# Patient Record
Sex: FEMALE | Race: BLACK OR AFRICAN AMERICAN | Employment: UNEMPLOYED | URBAN - METROPOLITAN AREA
[De-identification: names, ages, dates, MRNs, and addresses within clinical notes are randomized per-mention and may not be internally consistent; named-entity substitution may affect disease eponyms.]

---

## 2017-06-15 ENCOUNTER — OFFICE VISIT (OUTPATIENT)
Dept: FAMILY MEDICINE CLINIC | Age: 47
End: 2017-06-15

## 2017-06-15 ENCOUNTER — TELEPHONE (OUTPATIENT)
Dept: FAMILY MEDICINE CLINIC | Age: 47
End: 2017-06-15

## 2017-06-15 VITALS
TEMPERATURE: 98 F | WEIGHT: 201 LBS | HEIGHT: 67 IN | HEART RATE: 60 BPM | RESPIRATION RATE: 18 BRPM | BODY MASS INDEX: 31.55 KG/M2 | OXYGEN SATURATION: 98 % | SYSTOLIC BLOOD PRESSURE: 156 MMHG | DIASTOLIC BLOOD PRESSURE: 90 MMHG

## 2017-06-15 DIAGNOSIS — F32.A DEPRESSION, UNSPECIFIED DEPRESSION TYPE: ICD-10-CM

## 2017-06-15 DIAGNOSIS — Z12.4 CERVICAL CANCER SCREENING: ICD-10-CM

## 2017-06-15 DIAGNOSIS — E55.9 VITAMIN D DEFICIENCY: ICD-10-CM

## 2017-06-15 DIAGNOSIS — I10 HTN, GOAL BELOW 140/90: ICD-10-CM

## 2017-06-15 DIAGNOSIS — R20.9 COLD EXTREMITIES: ICD-10-CM

## 2017-06-15 DIAGNOSIS — Z13.1 DIABETES MELLITUS SCREENING: ICD-10-CM

## 2017-06-15 DIAGNOSIS — Z11.4 SCREENING FOR HIV (HUMAN IMMUNODEFICIENCY VIRUS): ICD-10-CM

## 2017-06-15 DIAGNOSIS — Z13.220 SCREENING FOR LIPID DISORDERS: ICD-10-CM

## 2017-06-15 DIAGNOSIS — N89.8 VAGINAL DISCHARGE: Primary | ICD-10-CM

## 2017-06-15 RX ORDER — CITALOPRAM 40 MG/1
40 TABLET, FILM COATED ORAL DAILY
Qty: 30 TAB | Refills: 1 | Status: SHIPPED | OUTPATIENT
Start: 2017-06-15 | End: 2017-09-14 | Stop reason: SDUPTHER

## 2017-06-15 RX ORDER — LISINOPRIL 10 MG/1
10 TABLET ORAL DAILY
COMMUNITY
End: 2017-06-15 | Stop reason: SDUPTHER

## 2017-06-15 RX ORDER — OMEPRAZOLE 40 MG/1
40 CAPSULE, DELAYED RELEASE ORAL DAILY
COMMUNITY
End: 2018-02-23

## 2017-06-15 RX ORDER — LISINOPRIL 10 MG/1
10 TABLET ORAL DAILY
Qty: 30 TAB | Refills: 1 | Status: SHIPPED | OUTPATIENT
Start: 2017-06-15 | End: 2017-09-14 | Stop reason: SDUPTHER

## 2017-06-15 RX ORDER — HYDROCHLOROTHIAZIDE 25 MG/1
25 TABLET ORAL DAILY
COMMUNITY
End: 2017-06-15 | Stop reason: SDUPTHER

## 2017-06-15 RX ORDER — CHOLECALCIFEROL (VITAMIN D3) 125 MCG
2000 CAPSULE ORAL DAILY
COMMUNITY

## 2017-06-15 RX ORDER — HYDROCHLOROTHIAZIDE 25 MG/1
25 TABLET ORAL DAILY
Qty: 30 TAB | Refills: 1 | Status: SHIPPED | OUTPATIENT
Start: 2017-06-15 | End: 2017-09-14 | Stop reason: SDUPTHER

## 2017-06-15 RX ORDER — CITALOPRAM 40 MG/1
40 TABLET, FILM COATED ORAL DAILY
COMMUNITY
End: 2017-06-15 | Stop reason: SDUPTHER

## 2017-06-15 NOTE — PATIENT INSTRUCTIONS

## 2017-06-15 NOTE — LETTER
6/26/2017 3:52 PM 
 
Ms. Melva Kate [address] [address] Dear Melva Kate: 
 
Please find your most recent results below. Resulted Orders NUSWAB VAGINITIS PLUS Result Value Ref Range Atopobium vaginae High - 2 (A) Score BVAB 2 High - 2 (A) Score Megasphaera 1 High - 2 (A) Score Comment:  
   Calculate total score by adding the 3 individual bacterial 
vaginosis (BV) marker scores together. Total score is 
interpreted as follows: Total score 0-1: Indicates the absence of BV. Total score   2: Indeterminate for BV. Additional clinical 
                 data should be evaluated to establish a 
                 diagnosis. Total score 3-6: Indicates the presence of BV. This test was developed and its performance characteristics 
determined by [facility].  It has not been cleared or approved 
by the Food and Drug Administration. The FDA has determined 
that such clearance or approval is not necessary. C. albicans, MYRIAM Negative Negative C. glabrata, MYRIAM Negative Negative Comment: This test was developed and its performance characteristics determined 
by [facility].  It has not been cleared or approved by the Food and Drug Administration. The FDA has determined that such clearance or 
approval is not necessary. T. vaginalis, MYRIAM Negative Negative C. trachomatis, MYRIAM Negative Negative N. gonorrhoeae, MYRIAM Negative Negative Narrative Performed at:  [address]  [id number] : Linder Lanes MD, Phone:  [phone] PAP IG, APTIMA HPV AND RFX 16/18,45 ([id number]) Result Value Ref Range Diagnosis Comment Comment:  
   NEGATIVE FOR INTRAEPITHELIAL LESION AND MALIGNANCY. Specimen adequacy Comment Comment:  
   Satisfactory for evaluation. Endocervical and/or squamous metaplastic 
cells (endocervical component) are present. Clinician provided ICD10 Comment Comment:  
   N89.8 Performed by: Comment Comment:  
   Sherryle Pace, Cytotechnologist (ASCP) Jaxson Leal Note: Comment Comment: The Pap smear is a screening test designed to aid in the detection of 
premalignant and malignant conditions of the uterine cervix. It is not a 
diagnostic procedure and should not be used as the sole means of detecting 
cervical cancer. Both false-positive and false-negative reports do occur. Test methodology Comment Comment: This liquid based ThinPrep(R) pap test was screened with the 
use of an image guided system. HPV APTIMA Negative Negative Comment: This test detects fourteen high-risk HPV types (16/18/31/33/35/39/45/ 
51/52/56/58/59/66/68) without differentiation. Narrative Specimen Comment: [id number] Specimen Comment: No. of containers. Jaxson Leal 01 CYTYC Thin Prep Vial 
Performed at:  [address]  [id number] : Halie Morris MD, Phone:  [phone] Performed at:  [address] [address]  [id number] : Halie Morris MD, Phone:  [phone] HEMOGLOBIN A1C WITH EAG Result Value Ref Range Hemoglobin A1c 6.0 (H) 4.8 - 5.6 % Comment:  
            Pre-diabetes: 5.7 - 6.4 Diabetes: >6.4 Glycemic control for adults with diabetes: <7.0 Estimated average glucose 126 mg/dL Narrative Performed at:  [address]  [id number] : Halie Morris MD, Phone:  [phone] METABOLIC PANEL, COMPREHENSIVE Result Value Ref Range Glucose 96 65 - 99 mg/dL BUN 11 6 - 24 mg/dL Creatinine 0.81 0.57 - 1.00 mg/dL GFR est non-AA 88 >59 mL/min/1.73 GFR est  >59 mL/min/1.73  
 BUN/Creatinine ratio 14 9 - 23 Sodium 144 134 - 144 mmol/L Potassium 4.5 3.5 - 5.2 mmol/L  Chloride 106 96 - 106 mmol/L  
 CO2 19 18 - 29 mmol/L Calcium 9.8 8.7 - 10.2 mg/dL Protein, total 6.9 6.0 - 8.5 g/dL Albumin 4.2 3.5 - 5.5 g/dL GLOBULIN, TOTAL 2.7 1.5 - 4.5 g/dL A-G Ratio 1.6 1.2 - 2.2 Bilirubin, total 0.3 0.0 - 1.2 mg/dL Alk. phosphatase 74 39 - 117 IU/L  
 AST (SGOT) 13 0 - 40 IU/L  
 ALT (SGPT) 11 0 - 32 IU/L Narrative Performed at:  [address]  [id number] : Alise Devlin MD, Phone:  [phone] TSH AND FREE T4 Result Value Ref Range TSH 1.790 0.450 - 4.500 uIU/mL T4, Free 1.03 0.82 - 1.77 ng/dL Narrative Performed at:  [address]  [id number] : Alise Devlin MD, Phone:  [phone] HIV 1/2 AG/AB, 4TH GENERATION,W RFLX CONFIRM Result Value Ref Range HIV SCREEN 4TH GENERATION WRFX Non Reactive Non Reactive Narrative Performed at:  [address]  [id number] : Alise Devlin MD, Phone:  [phone] LIPID PANEL Result Value Ref Range Cholesterol, total 182 100 - 199 mg/dL Triglyceride 98 0 - 149 mg/dL HDL Cholesterol 54 >39 mg/dL VLDL, calculated 20 5 - 40 mg/dL LDL, calculated 108 (H) 0 - 99 mg/dL Narrative Performed at:  [address]  [id number] : Alies Devlin MD, Phone:  [phone] VITAMIN D, 25 HYDROXY Result Value Ref Range VITAMIN D, 25-HYDROXY 17.6 (L) 30.0 - 100.0 ng/mL Comment:  
   Vitamin D deficiency has been defined by the [facility] practice guideline as a 
level of serum 25-OH vitamin D less than 20 ng/mL (1,2). The Endocrine Society went on to further define vitamin D 
insufficiency as a level between 21 and 29 ng/mL (2). 1. IOM ([place] [employer]). 2010. Dietary reference 
   intakes for calcium and D. [facility]:  [employer] 
 [employer]. 2. Declan MF, Ollie NC, Hunter YOUNG, et al. 
   Evaluation, treatment, and prevention of vitamin D 
   deficiency: an Endocrine Society clinical practice 
   guideline. JCEM. 2011 Jul; 96(7):1911-30. Narrative Performed at:  [address]  [id number] : Linder Lanes MD, Phone:  [phone] CVD REPORT Result Value Ref Range INTERPRETATION Note Comment:  
   Supplement report is available. Narrative Performed at:  [facility] [place] [facility] [address] [place] [address]  [id number] : Yohannes Graham PhD, Phone:  [phone] RECOMMENDATIONS: 
Pap test is normal with no HPV, which is great news. Have you ever had a history of abnormal Paps? Khoi Jules they have all been normal, your next Pap test would be due in 5 years. Reita Libman they have been abnormal please let us know so that we can recommend a proper follow-up time. Please call me if you have any questions: [phone] Sincerely, David Oliver MD

## 2017-06-15 NOTE — PROGRESS NOTES
Patient Name: Tera Pan   MRN: <[id number]>    Louann Welch is a 41 y.o. female who presents with the following: Here to establish care with new PCP. Vaginal discharge  Reports foul smelling vaginal discharge for several months. Admits that she is not good at remembering removing tampons. Has had STDs and PID in the past.  Does douche occasionally. No new soaps or lotions. Denies fevers, abdominal, n/v. The patient has hypertension. She reports taking medications as instructed, no medication side effects noted, patient does not perform home BP monitoring, no TIA's, no chest pain on exertion, no dyspnea on exertion, no swelling of ankles. Diet and Lifestyle: not attempting to follow a low fat, low cholesterol diet, sedentary. Lab review: no lab studies available for review at time of visit. Reports occasional numbness, tingling, and cold temperatures in extremities. Has not been consistently taking medications until the past 3 weeks. Review of Systems   Constitutional: Negative for chills, fever, malaise/fatigue and weight loss. HENT: Negative for hearing loss, nosebleeds and sore throat. Respiratory: Negative for cough, sputum production, shortness of breath and wheezing. Cardiovascular: Negative for chest pain, palpitations, leg swelling and PND. Gastrointestinal: Negative for abdominal pain, blood in stool, constipation, diarrhea, nausea and vomiting. Genitourinary: Negative for dysuria, frequency and urgency. Vaginal discharge   Musculoskeletal: Negative for back pain, falls, joint pain, myalgias and neck pain. Skin: Negative for itching and rash. Neurological: Positive for tingling. Negative for dizziness, sensory change, focal weakness and loss of consciousness. Psychiatric/Behavioral: Negative for depression. The patient is not nervous/anxious. All other systems reviewed and are negative.       The patient's medications, allergies, past medical history, surgical history, family history and social history were reviewed and updated where appropriate. Prior to Admission medications    Medication Sig Start Date End Date Taking? Authorizing Provider   hydroCHLOROthiazide (HYDRODIURIL) 25 mg tablet Take 25 mg by mouth daily. Yes Historical Provider   omeprazole (PRILOSEC) 40 mg capsule Take 40 mg by mouth daily. Yes Historical Provider   lisinopril (PRINIVIL, ZESTRIL) 10 mg tablet Take 10 mg by mouth daily. Yes Historical Provider   cholecalciferol, vitamin D3, (VITAMIN D3) 2,000 unit tab Take  by mouth. Yes Historical Provider   LINACLOTIDE (LINZESS PO) Take  by mouth. Historical Provider   citalopram (CELEXA) 40 mg tablet Take 40 mg by mouth daily. Historical Provider       Allergies   Allergen Reactions    Propofol Other (comments)     Patient states she has a difficult time waking up         Past Medical History:   Diagnosis Date    Depression     HTN, goal below 140/90     Hypercholesterolemia        Past Surgical History:   Procedure Laterality Date    HX  SECTION  1992, ,     HX LAPAROTOMY         Family History   Problem Relation Age of Onset    Drug Abuse Mother      cocaine    Depression Mother     Hypertension Mother     Drug Abuse Father      heroin    Cancer Maternal Grandmother      esophageal cancer    Cancer Maternal Grandfather     Colon Cancer Maternal Grandfather     Cancer Paternal Grandmother      brain    Prostate Cancer Paternal Grandfather     Lupus Maternal Aunt        Social History     Social History    Marital status:      Spouse name: N/A    Number of children: N/A    Years of education: N/A     Occupational History    Not on file.      Social History Main Topics    Smoking status: Current Some Day Smoker    Smokeless tobacco: Never Used      Comment: cigar    Alcohol use Yes      Comment: occasional    Drug use: No    Sexual activity: Yes     Other Topics Concern  Not on file     Social History Narrative    No narrative on file         OBJECTIVE    Visit Vitals    /90 (BP 1 Location: Right arm, BP Patient Position: Sitting)    Pulse 60    Temp 98 °F (36.7 °C) (Oral)    Resp 18    Ht 5' 7\" (1.702 m)    Wt 201 lb (91.2 kg)    LMP 05/22/2017 (Exact Date)    SpO2 98%    BMI 31.48 kg/m2       Physical Exam   Constitutional: She is well-developed, well-nourished, and in no distress. No distress. HENT:   Head: Normocephalic and atraumatic. Right Ear: External ear normal.   Left Ear: External ear normal.   Eyes: Conjunctivae and EOM are normal. Pupils are equal, round, and reactive to light. Cardiovascular: Normal rate, regular rhythm and normal heart sounds. Exam reveals no gallop and no friction rub. No murmur heard. Pulmonary/Chest: Effort normal and breath sounds normal. No respiratory distress. She has no wheezes. Skin: She is not diaphoretic. Psychiatric: Mood, memory, affect and judgment normal.   Nursing note and vitals reviewed. Pelvic exam: VULVA: normal appearing vulva with no masses, tenderness or lesions, VAGINA: normal appearing vagina with normal color and discharge, no lesions, CERVIX: normal appearing cervix without discharge or lesions, PAP: Pap smear done today, HPV test, no tampon in place. ASSESSMENT AND PLAN  Jessica Mahoney is a 41 y.o. female who presents today for:    1. Vaginal discharge  Recommend avoiding douching and tampon use. Will follow up with results and tx if needed. - NUSWAB VAGINITIS PLUS    2. Cervical cancer screening  - PAP IG, APTIMA HPV AND RFX 16/18,45 ([id number])    3. HTN, goal below 140/90  Above goal today. Keep meds the same as pt just started taking it regularly and RTC in 6 weeks. - hydroCHLOROthiazide (HYDRODIURIL) 25 mg tablet; Take 1 Tab by mouth daily. Dispense: 30 Tab; Refill: 1  - lisinopril (PRINIVIL, ZESTRIL) 10 mg tablet; Take 1 Tab by mouth daily. Dispense: 30 Tab; Refill: 1    4. Screening for HIV (human immunodeficiency virus)  - HIV 1/2 AG/AB, 4TH GENERATION,W RFLX CONFIRM    5. Vitamin D deficiency  - VITAMIN D, 25 HYDROXY    6. Screening for lipid disorders  Will calculate ASCVD risk score pending labs. - METABOLIC PANEL, COMPREHENSIVE  - LIPID PANEL    7. Cold extremities  - TSH AND FREE T4    8. Diabetes mellitus screening  - HEMOGLOBIN A1C WITH EAG    9. Depression, unspecified depression type  - citalopram (CELEXA) 40 mg tablet; Take 1 Tab by mouth daily. Dispense: 30 Tab; Refill: 1      Medications Discontinued During This Encounter   Medication Reason    citalopram (CELEXA) 40 mg tablet Reorder    hydroCHLOROthiazide (HYDRODIURIL) 25 mg tablet Reorder    lisinopril (PRINIVIL, ZESTRIL) 10 mg tablet Reorder    LINACLOTIDE (LINZESS PO) Not A Current Medication       Follow-up Disposition:  Return in about 6 weeks (around 7/27/2017) for HTN follow up. Medication risks/benefits/costs/interactions/alternatives discussed with patient. Advised patient to call back or return to office if symptoms worsen/change/persist. If patient cannot reach us or should anything more severe/urgent arise he/she should proceed directly to the nearest emergency department. Discussed expected course/resolution/complications of diagnosis in detail with patient. Patient given a written after visit summary which includes his/her diagnoses, current medications and vitals. Patient expressed understanding with the diagnosis and plan.      Jovon Acevedo M.D.

## 2017-06-15 NOTE — PROGRESS NOTES
Chief Complaint   Patient presents with    New Patient     establish care with physician    Hypertension     routine visit    Cholesterol Problem     routine visit    Vaginal Discharge     pelvic cramping and back, some odor     1. Have you been to the ER, urgent care clinic since your last visit? Hospitalized since your last visit? No    2. Have you seen or consulted any other health care providers outside of the [address] [place] [address] since your last visit? Include any pap smears or colon screening.  No

## 2017-06-15 NOTE — MR AVS SNAPSHOT
Visit Information Date & Time Provider Department Dept. Phone Encounter #  
 6/15/2017 10:00 AM Karal Medina MD [facility] [phone] [id number] Follow-up Instructions Return in about 6 weeks (around 7/27/2017) for HTN follow up. Upcoming Health Maintenance Date Due DTaP/Tdap/Td series (1 - Tdap) 5/26/1993 PAP AKA CERVICAL CYTOLOGY 5/26/1993 INFLUENZA AGE 9 TO ADULT 8/1/2017 Allergies as of 6/15/2017  Review Complete On: 6/15/2017 By: Sabrina Mixon LPN Severity Noted Reaction Type Reactions Propofol  06/15/2017    Other (comments) Patient states she has a difficult time waking up Current Immunizations  Never Reviewed No immunizations on file. Not reviewed this visit You Were Diagnosed With   
  
 Codes Comments Vaginal discharge    -  Primary ICD-10-CM: N89.8 ICD-9-CM: 623.5 Cervical cancer screening     ICD-10-CM: Z12.4 ICD-9-CM: V76.2 HTN, goal below 140/90     ICD-10-CM: I10 
ICD-9-CM: 401.9 Screening for HIV (human immunodeficiency virus)     ICD-10-CM: Z11.4 ICD-9-CM: V73.89 Vitamin D deficiency     ICD-10-CM: E55.9 ICD-9-CM: 268.9 Lipid screening     ICD-10-CM: J88.944 ICD-9-CM: V77.91 Vitals BP Pulse Temp Resp Height(growth percentile) Weight(growth percentile) 156/90 (BP 1 Location: Right arm, BP Patient Position: Sitting) 60 98 °F (36.7 °C) (Oral) 18 5' 7\" (1.702 m) 201 lb (91.2 kg) LMP SpO2 BMI OB Status Smoking Status 05/22/2017 (Exact Date) 98% 31.48 kg/m2 Having regular periods Current Some Day Smoker Vitals History BMI and BSA Data Body Mass Index Body Surface Area  
 31.48 kg/m 2 2.08 m 2 Your Updated Medication List  
  
   
This list is accurate as of: 6/15/17 11:09 AM.  Always use your most recent med list.  
  
  
  
  
 citalopram 40 mg tablet Commonly known as:  Emily Schmitz Take 40 mg by mouth daily. hydroCHLOROthiazide 25 mg tablet Commonly known as:  HYDRODIURIL Take 25 mg by mouth daily. LINZESS PO Take  by mouth.  
  
 lisinopril 10 mg tablet Commonly known as:  Alanis Camel Take 10 mg by mouth daily. PriLOSEC 40 mg capsule Generic drug:  omeprazole Take 40 mg by mouth daily. VITAMIN D3 2,000 unit Tab Generic drug:  cholecalciferol (vitamin D3) Take  by mouth. We Performed the Following HEMOGLOBIN A1C WITH EAG [00604 CPT(R)] HIV 1/2 AG/AB, 4TH GENERATION,W RFLX CONFIRM [[id number] Custom] LIPID PANEL [75338 CPT(R)] METABOLIC PANEL, COMPREHENSIVE [37275 CPT(R)] [address] [id number] Custom] PAP IG, APTIMA HPV AND RFX 16/18,45 ([id number]) [[id number] Custom] TSH AND FREE T4 [82517 CPT(R)] VITAMIN D, 25 HYDROXY [id number] CPT(R)] Follow-up Instructions Return in about 6 weeks (around 7/27/2017) for HTN follow up. Patient Instructions DASH Diet: Care Instructions Your Care Instructions The DASH diet is an eating plan that can help lower your blood pressure. DASH stands for Dietary Approaches to Stop Hypertension. Hypertension is high blood pressure. The DASH diet focuses on eating foods that are high in calcium, potassium, and magnesium. These nutrients can lower blood pressure. The foods that are highest in these nutrients are fruits, vegetables, low-fat dairy products, nuts, seeds, and legumes. But taking calcium, potassium, and magnesium supplements instead of eating foods that are high in those nutrients does not have the same effect. The DASH diet also includes whole grains, fish, and poultry. The DASH diet is one of several lifestyle changes your doctor may recommend to lower your high blood pressure. Your doctor may also want you to decrease the amount of sodium in your diet. Lowering sodium while following the DASH diet can lower blood pressure even further than just the DASH diet alone. Follow-up care is a key part of your treatment and safety. Be sure to make and go to all appointments, and call your doctor if you are having problems. It's also a good idea to know your test results and keep a list of the medicines you take. How can you care for yourself at home? Following the DASH diet · Eat 4 to 5 servings of fruit each day. A serving is 1 medium-sized piece of fruit, ½ cup chopped or canned fruit, 1/4 cup dried fruit, or 4 ounces (½ cup) of fruit juice. Choose fruit more often than fruit juice. · Eat 4 to 5 servings of vegetables each day. A serving is 1 cup of lettuce or raw leafy vegetables, ½ cup of chopped or cooked vegetables, or 4 ounces (½ cup) of vegetable juice. Choose vegetables more often than vegetable juice. · Get 2 to 3 servings of low-fat and fat-free dairy each day. A serving is 8 ounces of milk, 1 cup of yogurt, or 1 ½ ounces of cheese. · Eat 6 to 8 servings of grains each day. A serving is 1 slice of bread, 1 ounce of dry cereal, or ½ cup of cooked rice, pasta, or cooked cereal. Try to choose whole-grain products as much as possible. · Limit lean meat, poultry, and fish to 2 servings each day. A serving is 3 ounces, about the size of a deck of cards. · Eat 4 to 5 servings of nuts, seeds, and legumes (cooked dried beans, lentils, and split peas) each week. A serving is 1/3 cup of nuts, 2 tablespoons of seeds, or ½ cup of cooked beans or peas. · Limit fats and oils to 2 to 3 servings each day. A serving is 1 teaspoon of vegetable oil or 2 tablespoons of salad dressing. · Limit sweets and added sugars to 5 servings or less a week. A serving is 1 tablespoon jelly or jam, ½ cup sorbet, or 1 cup of lemonade. · Eat less than 2,300 milligrams (mg) of sodium a day. If you limit your sodium to 1,500 mg a day, you can lower your blood pressure even more. Tips for success · Start small.  Do not try to make dramatic changes to your diet all at once. You might feel that you are missing out on your favorite foods and then be more likely to not follow the plan. Make small changes, and stick with them. Once those changes become habit, add a few more changes. · Try some of the following: ¨ Make it a goal to eat a fruit or vegetable at every meal and at snacks. This will make it easy to get the recommended amount of fruits and vegetables each day. ¨ Try yogurt topped with fruit and nuts for a snack or healthy dessert. ¨ Add lettuce, tomato, cucumber, and onion to sandwiches. ¨ Combine a ready-made pizza crust with low-fat mozzarella cheese and lots of vegetable toppings. Try using tomatoes, squash, spinach, broccoli, carrots, cauliflower, and onions. ¨ Have a variety of cut-up vegetables with a low-fat dip as an appetizer instead of chips and dip. ¨ Sprinkle sunflower seeds or chopped almonds over salads. Or try adding chopped walnuts or almonds to cooked vegetables. ¨ Try some vegetarian meals using beans and peas. Add garbanzo or kidney beans to salads. Make burritos and tacos with mashed koch beans or black beans. Where can you learn more? Go to http://darcie-jessi.info/. Enter O796 in the search box to learn more about \"DASH Diet: Care Instructions. \" Current as of: March 23, 2016 Content Version: 11.2 © 2650-3247 [employer]. Care instructions adapted under license by [employer] (which disclaims liability or warranty for this information). If you have questions about a medical condition or this instruction, always ask your healthcare professional. [place] [address] any warranty or liability for your use of this information. Introducing [facility]! Leidy Springer introduces [employer] patient portal. Now you can access parts of your medical record, email your doctor's office, and request medication refills online.    
 
1. In your internet browser, go to [email or web address] 2. Click on the First Time User? Click Here link in the Sign In box. You will see the New Member Sign Up page. 3. Enter your [employer] Access Code exactly as it appears below. You will not need to use this code after youve completed the sign-up process. If you do not sign up before the expiration date, you must request a new code. · [employer] Access Code: [email or web address] Expires: 9/13/2017 10:44 AM 
 
4. Enter the last four digits of your Social Security Number (xxxx) and Date of Birth (mm/dd/yyyy) as indicated and click Submit. You will be taken to the next sign-up page. 5. Create a BISONt ID. This will be your [employer] login ID and cannot be changed, so think of one that is secure and easy to remember. 6. Create a [employer] password. You can change your password at any time. 7. Enter your Password Reset Question and Answer. This can be used at a later time if you forget your password. 8. Enter your e-mail address. You will receive e-mail notification when new information is available in [address] 9. Click Sign Up. You can now view and download portions of your medical record. 10. Click the Download Summary menu link to download a portable copy of your medical information. If you have questions, please visit the Frequently Asked Questions section of the [employer] website. Remember, [employer] is NOT to be used for urgent needs. For medical emergencies, dial 911. Now available from your iPhone and Android! Please provide this summary of care documentation to your next provider. Your primary care clinician is listed as Rico Pelletier. If you have any questions after today's visit, please call [phone].

## 2017-06-16 LAB
25(OH)D3+25(OH)D2 SERPL-MCNC: 17.6 NG/ML (ref 30–100)
ALBUMIN SERPL-MCNC: 4.2 G/DL (ref 3.5–5.5)
ALBUMIN/GLOB SERPL: 1.6 {RATIO} (ref 1.2–2.2)
ALP SERPL-CCNC: 74 IU/L (ref 39–117)
ALT SERPL-CCNC: 11 IU/L (ref 0–32)
AST SERPL-CCNC: 13 IU/L (ref 0–40)
BILIRUB SERPL-MCNC: 0.3 MG/DL (ref 0–1.2)
BUN SERPL-MCNC: 11 MG/DL (ref 6–24)
BUN/CREAT SERPL: 14 (ref 9–23)
CALCIUM SERPL-MCNC: 9.8 MG/DL (ref 8.7–10.2)
CHLORIDE SERPL-SCNC: 106 MMOL/L (ref 96–106)
CHOLEST SERPL-MCNC: 182 MG/DL (ref 100–199)
CO2 SERPL-SCNC: 19 MMOL/L (ref 18–29)
CREAT SERPL-MCNC: 0.81 MG/DL (ref 0.57–1)
EST. AVERAGE GLUCOSE BLD GHB EST-MCNC: 126 MG/DL
GLOBULIN SER CALC-MCNC: 2.7 G/DL (ref 1.5–4.5)
GLUCOSE SERPL-MCNC: 96 MG/DL (ref 65–99)
HBA1C MFR BLD: 6 % (ref 4.8–5.6)
HDLC SERPL-MCNC: 54 MG/DL
HIV 1+2 AB+HIV1 P24 AG SERPL QL IA: NON REACTIVE
INTERPRETATION, 910389: NORMAL
LDLC SERPL CALC-MCNC: 108 MG/DL (ref 0–99)
POTASSIUM SERPL-SCNC: 4.5 MMOL/L (ref 3.5–5.2)
PROT SERPL-MCNC: 6.9 G/DL (ref 6–8.5)
SODIUM SERPL-SCNC: 144 MMOL/L (ref 134–144)
T4 FREE SERPL-MCNC: 1.03 NG/DL (ref 0.82–1.77)
TRIGL SERPL-MCNC: 98 MG/DL (ref 0–149)
TSH SERPL DL<=0.005 MIU/L-ACNC: 1.79 UIU/ML (ref 0.45–4.5)
VLDLC SERPL CALC-MCNC: 20 MG/DL (ref 5–40)

## 2017-06-18 LAB
A VAGINAE DNA VAG QL NAA+PROBE: ABNORMAL SCORE
BVAB2 DNA VAG QL NAA+PROBE: ABNORMAL SCORE
C ALBICANS DNA VAG QL NAA+PROBE: NEGATIVE
C GLABRATA DNA VAG QL NAA+PROBE: NEGATIVE
C TRACH RRNA SPEC QL NAA+PROBE: NEGATIVE
MEGA1 DNA VAG QL NAA+PROBE: ABNORMAL SCORE
N GONORRHOEA RRNA SPEC QL NAA+PROBE: NEGATIVE
T VAGINALIS RRNA SPEC QL NAA+PROBE: NEGATIVE

## 2017-06-19 DIAGNOSIS — N76.0 BV (BACTERIAL VAGINOSIS): Primary | ICD-10-CM

## 2017-06-19 DIAGNOSIS — B96.89 BV (BACTERIAL VAGINOSIS): Primary | ICD-10-CM

## 2017-06-19 DIAGNOSIS — E55.9 VITAMIN D DEFICIENCY: ICD-10-CM

## 2017-06-19 RX ORDER — METRONIDAZOLE 500 MG/1
500 TABLET ORAL 2 TIMES DAILY
Qty: 14 TAB | Refills: 0 | Status: SHIPPED | OUTPATIENT
Start: 2017-06-19 | End: 2017-06-26

## 2017-06-19 RX ORDER — ASPIRIN 325 MG
TABLET, DELAYED RELEASE (ENTERIC COATED) ORAL
Qty: 8 CAP | Refills: 0 | Status: SHIPPED | OUTPATIENT
Start: 2017-06-19 | End: 2017-08-24

## 2017-06-19 NOTE — TELEPHONE ENCOUNTER
Jennifer Fox is not covered on this patient's insurance formulary, nor does she have the try and fail on other products to merit the PA.      Other alternatives per the pharmacy, Lactulose or Miralax

## 2017-06-19 NOTE — PROGRESS NOTES
Please notify patient regarding their test results:    Bacterial vaginosis diagnosis confirmed; 7 day course of oral Flagyl antibiotics have been sent to the pharmacy. Patient to avoid intercourse and alcohol consumption while on medications. Negative yeast infection, gonorrhea, chlamydia, and Trichomonas infection. Negative HIV. Hemoglobin A1C (average blood sugar level for past 3 months) is in the pre diabetes range. Elevated LCL cholesterol. I would encourage healthy diets and regular exercise with the goal of healthy weight loss before starting medications for this. Thyroid levels are normal.   Vitamin D levels are low. A prescription for high dose vitamin D3 at 50,000 units once a week for 8 weeks has been sent. After 8 weeks, switch to a lower dose of over-the-counter vitamin D3 2000 units every day. Pap smear results are still pending; will follow up with results finalized.

## 2017-06-19 NOTE — TELEPHONE ENCOUNTER
Instead of prescription medication at this point, would recommend daily Metamucil supplements until follow-up appointment to discuss constipation further during office visit as this was not addressed during our initial visit.

## 2017-06-20 NOTE — TELEPHONE ENCOUNTER
Spoke to patient this AM, advised her of the suggestions below. Patient/ Caller given an opportunity to ask questions, repeated information, and verbalized understanding.

## 2017-06-21 LAB
CYTOLOGIST CVX/VAG CYTO: NORMAL
CYTOLOGY CVX/VAG DOC THIN PREP: NORMAL
DX ICD CODE: NORMAL
HPV I/H RISK 4 DNA CVX QL PROBE+SIG AMP: NEGATIVE
Lab: NORMAL
OTHER STN SPEC: NORMAL
PATH REPORT.FINAL DX SPEC: NORMAL
STAT OF ADQ CVX/VAG CYTO-IMP: NORMAL

## 2017-06-22 NOTE — PROGRESS NOTES
Please notify patient regarding their test results:    Pap test is normal with no HPV, which is great news. Please clarify if patient has ever had a history of abnormal Paps (including date). If they have all been normal, her next Pap test would be due in 5 years. If they have been abnormal please relay back to me so that I can recommend a proper follow-up time.

## 2017-06-22 NOTE — PROGRESS NOTES
Called patient and advised of results, advised would follow up with Pap results when available, patient will  prescriptions. Patient/ Caller given an opportunity to ask questions, repeated information, and verbalized understanding.

## 2017-07-03 ENCOUNTER — TELEPHONE (OUTPATIENT)
Dept: FAMILY MEDICINE CLINIC | Age: 47
End: 2017-07-03

## 2017-07-03 NOTE — TELEPHONE ENCOUNTER
Insurance does not cover [address] patient needs to have a trail of Lactulose or Polyethylene glycol.

## 2017-07-12 ENCOUNTER — TELEPHONE (OUTPATIENT)
Dept: FAMILY MEDICINE CLINIC | Age: 47
End: 2017-07-12

## 2017-07-12 NOTE — TELEPHONE ENCOUNTER
Kishore Hinkle     -     [phone]     -   Patient is having a chocolate brown discharge -   Requesting to speak with the nurse

## 2017-07-12 NOTE — TELEPHONE ENCOUNTER
Writer returned call to patient, patient advised she has noted brown odorless discharge from her vaginal area since previous appointment 06/15 and today. Patient was prescribed course of antibiotics Flagyl for BV and completed the medication. Patient advises she has not had a menstrual cycle since her last office visit. Patient inquired if discharge was related to medication, writer advised patient that is not a general side effect of Flagyl. Inquiry sent to provider for review and advisements.

## 2017-07-13 NOTE — TELEPHONE ENCOUNTER
Recommend pt to take home pregnancy test due to missed period if she has been sexually active since last OV. Pt to make appt if symptoms of ongoing discharge persist; agree that this should not be side effect of medication.

## 2017-07-13 NOTE — TELEPHONE ENCOUNTER
Patient is calling in regards to a missed call from [place], tried contacting nurse, no success.     Best call back # for patient: [phone]

## 2017-07-13 NOTE — TELEPHONE ENCOUNTER
Writer returned call to patient, reviewed below advisements from . Patient understood and agreed to understanding.

## 2017-08-03 ENCOUNTER — OFFICE VISIT (OUTPATIENT)
Dept: FAMILY MEDICINE CLINIC | Age: 47
End: 2017-08-03

## 2017-08-03 VITALS
TEMPERATURE: 97.9 F | SYSTOLIC BLOOD PRESSURE: 138 MMHG | HEIGHT: 67 IN | OXYGEN SATURATION: 98 % | BODY MASS INDEX: 30.7 KG/M2 | DIASTOLIC BLOOD PRESSURE: 78 MMHG | WEIGHT: 195.6 LBS | HEART RATE: 56 BPM | RESPIRATION RATE: 18 BRPM

## 2017-08-03 DIAGNOSIS — I10 HTN, GOAL BELOW 140/90: Primary | ICD-10-CM

## 2017-08-03 DIAGNOSIS — N91.5 OLIGOMENORRHEA: ICD-10-CM

## 2017-08-03 DIAGNOSIS — Z23 ENCOUNTER FOR IMMUNIZATION: ICD-10-CM

## 2017-08-03 LAB
HCG URINE, QL. (POC): NEGATIVE
VALID INTERNAL CONTROL?: YES

## 2017-08-03 NOTE — MR AVS SNAPSHOT
Visit Information Date & Time Provider Department Dept. Phone Encounter #  
 8/3/2017  9:45 AM Reji Heath  [facility] [address] [phone] [id number] Follow-up Instructions Return in about 3 months (around 11/3/2017) for HTN follow up. Upcoming Health Maintenance Date Due Pneumococcal 19-64 Medium Risk (1 of 1 - PPSV23) 5/26/1991 DTaP/Tdap/Td series (1 - Tdap) 5/26/1993 INFLUENZA AGE 9 TO ADULT 8/1/2017 PAP AKA CERVICAL CYTOLOGY 6/15/2022 Allergies as of 8/3/2017  Review Complete On: 8/3/2017 By: Andre Diaz LPN Severity Noted Reaction Type Reactions Propofol  06/15/2017    Other (comments) Patient states she has a difficult time waking up Current Immunizations  Never Reviewed No immunizations on file. Not reviewed this visit You Were Diagnosed With   
  
 Codes Comments Amenorrhea    -  Primary ICD-10-CM: N91.2 ICD-9-CM: 626.0 Vitals BP Pulse Temp Resp Height(growth percentile) Weight(growth percentile) 138/78 (BP 1 Location: Left arm, BP Patient Position: Sitting) (!) 56 97.9 °F (36.6 °C) (Oral) 18 5' 7\" (1.702 m) 195 lb 9.6 oz (88.7 kg) LMP SpO2 BMI OB Status Smoking Status 06/14/2017 (Approximate) 98% 30.64 kg/m2 Having regular periods Current Some Day Smoker Vitals History BMI and BSA Data Body Mass Index Body Surface Area  
 30.64 kg/m 2 2.05 m 2 Preferred Pharmacy Pharmacy Name Phone [id number] [place], [address] [facility] [phone] Your Updated Medication List  
  
   
This list is accurate as of: 8/3/17 10:07 AM.  Always use your most recent med list.  
  
  
  
  
 citalopram 40 mg tablet Commonly known as:  Kaushik Campanile Take 1 Tab by mouth daily. hydroCHLOROthiazide 25 mg tablet Commonly known as:  HYDRODIURIL Take 1 Tab by mouth daily. linaclotide 145 mcg Cap capsule Commonly known as:  Marcello Triana Take 1 Cap by mouth Daily (before breakfast). lisinopril 10 mg tablet Commonly known as:  Gilles Carbajal Take 1 Tab by mouth daily. PriLOSEC 40 mg capsule Generic drug:  omeprazole Take 40 mg by mouth daily. * VITAMIN D3 2,000 unit Tab Generic drug:  cholecalciferol (vitamin D3) Take  by mouth. * Cholecalciferol (Vitamin D3) 50,000 unit Cap Take 1 capsule once a week for the next 8 weeks; if available OTC, please notify pt * Notice: This list has 2 medication(s) that are the same as other medications prescribed for you. Read the directions carefully, and ask your doctor or other care provider to review them with you. We Performed the Following AMB POC URINE PREGNANCY TEST, VISUAL COLOR COMPARISON [05321 CPT(R)] Follow-up Instructions Return in about 3 months (around 11/3/2017) for HTN follow up. Patient Instructions DASH Diet: Care Instructions Your Care Instructions The DASH diet is an eating plan that can help lower your blood pressure. DASH stands for Dietary Approaches to Stop Hypertension. Hypertension is high blood pressure. The DASH diet focuses on eating foods that are high in calcium, potassium, and magnesium. These nutrients can lower blood pressure. The foods that are highest in these nutrients are fruits, vegetables, low-fat dairy products, nuts, seeds, and legumes. But taking calcium, potassium, and magnesium supplements instead of eating foods that are high in those nutrients does not have the same effect. The DASH diet also includes whole grains, fish, and poultry. The DASH diet is one of several lifestyle changes your doctor may recommend to lower your high blood pressure. Your doctor may also want you to decrease the amount of sodium in your diet. Lowering sodium while following the DASH diet can lower blood pressure even further than just the DASH diet alone. Follow-up care is a key part of your treatment and safety. Be sure to make and go to all appointments, and call your doctor if you are having problems. It's also a good idea to know your test results and keep a list of the medicines you take. How can you care for yourself at home? Following the DASH diet · Eat 4 to 5 servings of fruit each day. A serving is 1 medium-sized piece of fruit, ½ cup chopped or canned fruit, 1/4 cup dried fruit, or 4 ounces (½ cup) of fruit juice. Choose fruit more often than fruit juice. · Eat 4 to 5 servings of vegetables each day. A serving is 1 cup of lettuce or raw leafy vegetables, ½ cup of chopped or cooked vegetables, or 4 ounces (½ cup) of vegetable juice. Choose vegetables more often than vegetable juice. · Get 2 to 3 servings of low-fat and fat-free dairy each day. A serving is 8 ounces of milk, 1 cup of yogurt, or 1 ½ ounces of cheese. · Eat 6 to 8 servings of grains each day. A serving is 1 slice of bread, 1 ounce of dry cereal, or ½ cup of cooked rice, pasta, or cooked cereal. Try to choose whole-grain products as much as possible. · Limit lean meat, poultry, and fish to 2 servings each day. A serving is 3 ounces, about the size of a deck of cards. · Eat 4 to 5 servings of nuts, seeds, and legumes (cooked dried beans, lentils, and split peas) each week. A serving is 1/3 cup of nuts, 2 tablespoons of seeds, or ½ cup of cooked beans or peas. · Limit fats and oils to 2 to 3 servings each day. A serving is 1 teaspoon of vegetable oil or 2 tablespoons of salad dressing. · Limit sweets and added sugars to 5 servings or less a week. A serving is 1 tablespoon jelly or jam, ½ cup sorbet, or 1 cup of lemonade. · Eat less than 2,300 milligrams (mg) of sodium a day. If you limit your sodium to 1,500 mg a day, you can lower your blood pressure even more. Tips for success · Start small.  Do not try to make dramatic changes to your diet all at once. You might feel that you are missing out on your favorite foods and then be more likely to not follow the plan. Make small changes, and stick with them. Once those changes become habit, add a few more changes. · Try some of the following: ¨ Make it a goal to eat a fruit or vegetable at every meal and at snacks. This will make it easy to get the recommended amount of fruits and vegetables each day. ¨ Try yogurt topped with fruit and nuts for a snack or healthy dessert. ¨ Add lettuce, tomato, cucumber, and onion to sandwiches. ¨ Combine a ready-made pizza crust with low-fat mozzarella cheese and lots of vegetable toppings. Try using tomatoes, squash, spinach, broccoli, carrots, cauliflower, and onions. ¨ Have a variety of cut-up vegetables with a low-fat dip as an appetizer instead of chips and dip. ¨ Sprinkle sunflower seeds or chopped almonds over salads. Or try adding chopped walnuts or almonds to cooked vegetables. ¨ Try some vegetarian meals using beans and peas. Add garbanzo or kidney beans to salads. Make burritos and tacos with mashed koch beans or black beans. Where can you learn more? Go to http://darcie-jessi.info/. Enter S574 in the search box to learn more about \"DASH Diet: Care Instructions. \" Current as of: April 3, 2017 Content Version: 11.3 © 9669-3487 [employer]. Care instructions adapted under license by [employer] (which disclaims liability or warranty for this information). If you have questions about a medical condition or this instruction, always ask your healthcare professional. [place] [address] any warranty or liability for your use of this information. Introducing [facility]! Franc Balderas introduces [facility] patient portal. Now you can access parts of your medical record, email your doctor's office, and request medication refills online.    
 
1. In your internet browser, go to [email or web address] 2. Click on the First Time User? Click Here link in the Sign In box. You will see the New Member Sign Up page. 3. Enter your [employer] Access Code exactly as it appears below. You will not need to use this code after youve completed the sign-up process. If you do not sign up before the expiration date, you must request a new code. · [employer] Access Code: [email or web address] Expires: 9/13/2017 10:44 AM 
 
4. Enter the last four digits of your Social Security Number (xxxx) and Date of Birth (mm/dd/yyyy) as indicated and click Submit. You will be taken to the next sign-up page. 5. Create a [employer] ID. This will be your [employer] login ID and cannot be changed, so think of one that is secure and easy to remember. 6. Create a [employer] password. You can change your password at any time. 7. Enter your Password Reset Question and Answer. This can be used at a later time if you forget your password. 8. Enter your e-mail address. You will receive e-mail notification when new information is available in [address] 9. Click Sign Up. You can now view and download portions of your medical record. 10. Click the Download Summary menu link to download a portable copy of your medical information. If you have questions, please visit the Frequently Asked Questions section of the [employer] website. Remember, [employer] is NOT to be used for urgent needs. For medical emergencies, dial 911. Now available from your iPhone and Android! Please provide this summary of care documentation to your next provider. Your primary care clinician is listed as Rico Pelletier. If you have any questions after today's visit, please call [phone].

## 2017-08-03 NOTE — PROGRESS NOTES
Patient Name: Tino Crockett   MRN: <[id number]>    Melida Amin is a 41 y.o. female who presents with the following: The patient has hypertension. She reports taking medications as instructed, no medication side effects noted, patient does not perform home BP monitoring, no TIA's, no chest pain on exertion, no dyspnea on exertion, no swelling of ankles. Diet and Lifestyle: generally follows a low fat low cholesterol diet, generally follows a low sodium diet, exercises regularly. Lab review: no lab studies available for review at time of visit. Has been taking medications more consistently. Under a lot of stress due to loss of employment and looking for a new job. Her last menses was 6/14/2017. Hx of BTL and has had multiple negative home UPTs (negative here in office). Believes her mother had menopause in her 46s. Previously had some brown vaginal discharge s/p treatment for BV but denies any return of prior symptoms recently. Review of Systems   Constitutional: Negative for fever, malaise/fatigue and weight loss. Respiratory: Negative for cough, hemoptysis, shortness of breath and wheezing. Cardiovascular: Negative for chest pain, palpitations, leg swelling and PND. Gastrointestinal: Negative for abdominal pain, constipation, diarrhea, nausea and vomiting. The patient's medications, allergies, past medical history, surgical history, family history and social history were reviewed and updated where appropriate. Prior to Admission medications    Medication Sig Start Date End Date Taking? Authorizing Provider   Cholecalciferol, Vitamin D3, 50,000 unit cap Take 1 capsule once a week for the next 8 weeks; if available OTC, please notify pt 6/19/17  Yes Rafael Medrano MD   omeprazole (PRILOSEC) 40 mg capsule Take 40 mg by mouth daily. Yes Historical Provider   cholecalciferol, vitamin D3, (VITAMIN D3) 2,000 unit tab Take  by mouth.    Yes Historical Provider citalopram (CELEXA) 40 mg tablet Take 1 Tab by mouth daily. 6/15/17  Yes Serg Teran MD   hydroCHLOROthiazide (HYDRODIURIL) 25 mg tablet Take 1 Tab by mouth daily. 6/15/17  Yes Rico Pelletier MD   lisinopril (PRINIVIL, ZESTRIL) 10 mg tablet Take 1 Tab by mouth daily. 6/15/17  Yes Serg Teran MD   linaclotide (LINZESS) 145 mcg cap capsule Take 1 Cap by mouth Daily (before breakfast). 6/15/17  Yes Serg Teran MD       Allergies   Allergen Reactions    Propofol Other (comments)     Patient states she has a difficult time waking up           OBJECTIVE    Visit Vitals    BP (!) 152/97 (BP 1 Location: Left arm, BP Patient Position: Sitting)    Pulse (!) 56    Temp 97.9 °F (36.6 °C) (Oral)    Resp 18    Ht 5' 7\" (1.702 m)    Wt 195 lb 9.6 oz (88.7 kg)    LMP 06/14/2017 (Approximate)  Comment: patient has taken 2 home pregnancy tests both negative     SpO2 98%    BMI 30.64 kg/m2       Physical Exam   Constitutional: She is oriented to person, place, and time and well-developed, well-nourished, and in no distress. No distress. HENT:   Head: Normocephalic and atraumatic. Right Ear: External ear normal.   Left Ear: External ear normal.   Eyes: Conjunctivae and EOM are normal. Pupils are equal, round, and reactive to light. Neurological: She is alert and oriented to person, place, and time. Gait normal.   Skin: She is not diaphoretic. Psychiatric: Mood, memory, affect and judgment normal.   Nursing note and vitals reviewed. ASSESSMENT AND PLAN  Jessica Euceda is a 41 y.o. female who presents today for:    1. HTN, goal below 140/90  Stable, continue current treatment. 2. Oligomenorrhea  UPT negative today. Reviewed that she may be pre-menopausal or external stress may be affect HPA axis, causing irregular periods. If she still has not had menses in 3 month's time, consider checking LH/FSH and other labs. - AMB POC URINE PREGNANCY TEST, VISUAL COLOR COMPARISON    3.  Encounter for immunization  - [place] IMMUNIZ ADMIN,1 SINGLE/COMB VAC/TOXOID  - Tetanus, diphtheria toxoids and acellular pertussis (TDAP) vaccine, in individuals >=7 years, IM       There are no discontinued medications. Follow-up Disposition:  Return in about 3 months (around 11/3/2017) for HTN follow up. Medication risks/benefits/costs/interactions/alternatives discussed with patient. Advised patient to call back or return to office if symptoms worsen/change/persist. If patient cannot reach us or should anything more severe/urgent arise he/she should proceed directly to the nearest emergency department. Discussed expected course/resolution/complications of diagnosis in detail with patient. Patient given a written after visit summary which includes his/her diagnoses, current medications and vitals. Patient expressed understanding with the diagnosis and plan.      Joaquina Ellison M.D.

## 2017-08-03 NOTE — PROGRESS NOTES
\"Reviewed record in preparation for visit and have obtained the necessary documentation\"  Chief Complaint   Patient presents with    Hypertension     follow up     Vaginal Discharge     brown          Patient presents in the office today for a follow up of hypertension    Patient also requested to discuss brown discharge noted from vaginal area in July, approximately July 12,patient advised to take pregnancy test,patient took urine pregnancy test x 2 July 12 and 31, both negative     Patient did not have menstrual cycle for the month of July and advised she no longer has noted the discharge      1. Have you been to the ER, urgent care clinic since your last visit? Hospitalized since your last visit? No    2. Have you seen or consulted any other health care providers outside of the [address] [place] [address] since your last visit? Include any pap smears or colon screening.  No

## 2017-08-03 NOTE — PATIENT INSTRUCTIONS
DASH Diet: Care Instructions  Your Care Instructions  The DASH diet is an eating plan that can help lower your blood pressure. DASH stands for Dietary Approaches to Stop Hypertension. Hypertension is high blood pressure. The DASH diet focuses on eating foods that are high in calcium, potassium, and magnesium. These nutrients can lower blood pressure. The foods that are highest in these nutrients are fruits, vegetables, low-fat dairy products, nuts, seeds, and legumes. But taking calcium, potassium, and magnesium supplements instead of eating foods that are high in those nutrients does not have the same effect. The DASH diet also includes whole grains, fish, and poultry. The DASH diet is one of several lifestyle changes your doctor may recommend to lower your high blood pressure. Your doctor may also want you to decrease the amount of sodium in your diet. Lowering sodium while following the DASH diet can lower blood pressure even further than just the DASH diet alone. Follow-up care is a key part of your treatment and safety. Be sure to make and go to all appointments, and call your doctor if you are having problems. It's also a good idea to know your test results and keep a list of the medicines you take. How can you care for yourself at home? Following the DASH diet  · Eat 4 to 5 servings of fruit each day. A serving is 1 medium-sized piece of fruit, ½ cup chopped or canned fruit, 1/4 cup dried fruit, or 4 ounces (½ cup) of fruit juice. Choose fruit more often than fruit juice. · Eat 4 to 5 servings of vegetables each day. A serving is 1 cup of lettuce or raw leafy vegetables, ½ cup of chopped or cooked vegetables, or 4 ounces (½ cup) of vegetable juice. Choose vegetables more often than vegetable juice. · Get 2 to 3 servings of low-fat and fat-free dairy each day. A serving is 8 ounces of milk, 1 cup of yogurt, or 1 ½ ounces of cheese. · Eat 6 to 8 servings of grains each day.  A serving is 1 slice of bread, 1 ounce of dry cereal, or ½ cup of cooked rice, pasta, or cooked cereal. Try to choose whole-grain products as much as possible. · Limit lean meat, poultry, and fish to 2 servings each day. A serving is 3 ounces, about the size of a deck of cards. · Eat 4 to 5 servings of nuts, seeds, and legumes (cooked dried beans, lentils, and split peas) each week. A serving is 1/3 cup of nuts, 2 tablespoons of seeds, or ½ cup of cooked beans or peas. · Limit fats and oils to 2 to 3 servings each day. A serving is 1 teaspoon of vegetable oil or 2 tablespoons of salad dressing. · Limit sweets and added sugars to 5 servings or less a week. A serving is 1 tablespoon jelly or jam, ½ cup sorbet, or 1 cup of lemonade. · Eat less than 2,300 milligrams (mg) of sodium a day. If you limit your sodium to 1,500 mg a day, you can lower your blood pressure even more. Tips for success  · Start small. Do not try to make dramatic changes to your diet all at once. You might feel that you are missing out on your favorite foods and then be more likely to not follow the plan. Make small changes, and stick with them. Once those changes become habit, add a few more changes. · Try some of the following:  ¨ Make it a goal to eat a fruit or vegetable at every meal and at snacks. This will make it easy to get the recommended amount of fruits and vegetables each day. ¨ Try yogurt topped with fruit and nuts for a snack or healthy dessert. ¨ Add lettuce, tomato, cucumber, and onion to sandwiches. ¨ Combine a ready-made pizza crust with low-fat mozzarella cheese and lots of vegetable toppings. Try using tomatoes, squash, spinach, broccoli, carrots, cauliflower, and onions. ¨ Have a variety of cut-up vegetables with a low-fat dip as an appetizer instead of chips and dip. ¨ Sprinkle sunflower seeds or chopped almonds over salads. Or try adding chopped walnuts or almonds to cooked vegetables. ¨ Try some vegetarian meals using beans and peas. Add garbanzo or kidney beans to salads. Make burritos and tacos with mashed koch beans or black beans. Where can you learn more? Go to http://darcie-jessi.info/. Enter Z186 in the search box to learn more about \"DASH Diet: Care Instructions. \"  Current as of: April 3, 2017  Content Version: 11.3  © 4123-4160 [employer]. Care instructions adapted under license by [employer] (which disclaims liability or warranty for this information). If you have questions about a medical condition or this instruction, always ask your healthcare professional. [address] any warranty or liability for your use of this information. Vaccine Information Statement     Tdap (Tetanus, Diphtheria, Pertussis) Vaccine: What You Need to Know    Many Vaccine Information Statements are available in Indonesian and other languages. See www.immunize.org/vis. Hojas de Información Sobre Vacunas están disponibles en español y en muchos otros idiomas. [facility]    1. Why get vaccinated? Tetanus, diphtheria, and pertussis are very serious diseases. Tdap vaccine can protect us from these diseases. And, Tdap vaccine given to pregnant women can protect  babies against pertussis. TETANUS (Lockjaw) is rare in the [facility] today. It causes painful muscle tightening and stiffness, usually all over the body.  It can lead to tightening of muscles in the head and neck so you cant open your mouth, swallow, or sometimes even breathe. Tetanus kills about 1 out of 10 people who are infected even after receiving the best medical care. DIPHTHERIA is also rare in the [facility] today. It can cause a thick coating to form in the back of the throat.  It can lead to breathing problems, heart failure, paralysis, and death.     PERTUSSIS (Whooping Cough) causes severe coughing spells, which can cause difficulty breathing, vomiting, and disturbed sleep.   It can also lead to weight loss, incontinence, and rib fractures. Up to 2 in 100 adolescents and 5 in 100 adults with pertussis are hospitalized or have complications, which could include pneumonia or death. These diseases are caused by bacteria. Diphtheria and pertussis are spread from person to person through secretions from coughing or sneezing. Tetanus enters the body through cuts, scratches, or wounds. Before vaccines, as many as 200,000 cases of diphtheria, 200,000 cases of pertussis, and hundreds of cases of tetanus, were reported in the [place] each year. Since vaccination began, reports of cases for tetanus and diphtheria have dropped by about 99% and for pertussis by about 80%. 2. Tdap vaccine    Tdap vaccine can protect adolescents and adults from tetanus, diphtheria, and pertussis. One dose of Tdap is routinely given at age 6 or 15. People who did not get Tdap at that age should get it as soon as possible. Tdap is especially important for health care professionals and anyone having close contact with a baby younger than 12 months. Pregnant women should get a dose of Tdap during every pregnancy, to protect the  from pertussis. Infants are most at risk for severe, life-threatening complications from pertussis. Another vaccine, called Td, protects against tetanus and diphtheria, but not pertussis. A Td booster should be given every 10 years. Tdap may be given as one of these boosters if you have never gotten Tdap before. Tdap may also be given after a severe cut or burn to prevent tetanus infection. Your doctor or the person giving you the vaccine can give you more information. Tdap may safely be given at the same time as other vaccines.     3. Some people should not get this vaccine     A person who has ever had a life-threatening allergic reaction after a previous dose of any diphtheria, tetanus or pertussis containing vaccine, OR has a severe allergy to any part of this vaccine, should not get Tdap vaccine. Tell the person giving the vaccine about any severe allergies.  Anyone who had coma or long repeated seizures within 7 days after a childhood dose of DTP or DTaP, or a previous dose of Tdap, should not get Tdap, unless a cause other than the vaccine was found. They can still get Td.  Talk to your doctor if you:  - have seizures or another nervous system problem,  - had severe pain or swelling after any vaccine containing diphtheria, tetanus or pertussis,   - ever had a condition called Guillain Barré Syndrome (GBS),  - arent feeling well on the day the shot is scheduled. 4. Risks    With any medicine, including vaccines, there is a chance of side effects. These are usually mild and go away on their own. Serious reactions are also possible but are rare. Most people who get Tdap vaccine do not have any problems with it.     Mild Problems following Tdap  (Did not interfere with activities)   Pain where the shot was given (about 3 in 4 adolescents or 2 in 3 adults)   Redness or swelling where the shot was given (about 1 person in 5)   Mild fever of at least 100.4°F (up to about 1 in 25 adolescents or 1 in 100 adults)   Headache (about 3 or 4 people in 10)   Tiredness (about 1 person in 3 or 4)   Nausea, vomiting, diarrhea, stomach ache (up to 1 in 4 adolescents or 1 in 10 adults)   Chills,  sore joints (about 1 person in 10)   Body aches (about 1 person in 3 or 4)    Rash, swollen glands (uncommon)    Moderate Problems following Tdap  (Interfered with activities, but did not require medical attention)   Pain where the shot was given (up to 1 in 5 or 6)    Redness or swelling where the shot was given (up to about 1 in 16 adolescents or 1 in 12 adults)   Fever over 102°F (about 1 in 100 adolescents or 1 in 250 adults)   Headache (about 1 in 7 adolescents or 1 in 10 adults)   Nausea, vomiting, diarrhea, stomach ache (up to 1 or 3 people in 100)   Swelling of the entire arm where the shot was given (up to about 1 in 500). Severe Problems following Tdap  (Unable to perform usual activities; required medical attention)   Swelling, severe pain, bleeding, and redness in the arm where the shot was given (rare). Problems that could happen after any vaccine:     People sometimes faint after a medical procedure, including vaccination. Sitting or lying down for about 15 minutes can help prevent fainting, and injuries caused by a fall. Tell your doctor if you feel dizzy, or have vision changes or ringing in the ears.  Some people get severe pain in the shoulder and have difficulty moving the arm where a shot was given. This happens very rarely.  Any medication can cause a severe allergic reaction. Such reactions from a vaccine are very rare, estimated at fewer than 1 in a million doses, and would happen within a few minutes to a few hours after the vaccination. As with any medicine, there is a very remote chance of a vaccine causing a serious injury or death. The safety of vaccines is always being monitored. For more information, visit: www.cdc.gov/vaccinesafety/    5. What if there is a serious problem? What should I look for?  Look for anything that concerns you, such as signs of a severe allergic reaction, very high fever, or unusual behavior.  Signs of a severe allergic reaction can include hives, swelling of the face and throat, difficulty breathing, a fast heartbeat, dizziness, and weakness. These would usually start a few minutes to a few hours after the vaccination. What should I do?  If you think it is a severe allergic reaction or other emergency that cant wait, call 9-1-1 or get the person to the nearest hospital. Otherwise, call your doctor.  Afterward, the reaction should be reported to the Vaccine Adverse Event Reporting System (VAERS).  Your doctor might file this report, or you can do it yourself through the VAERS web site at www.vaers. [facility].gov, or by calling 5-772.381.6320. VAERS does not give medical advice. 6. The National Vaccine Injury Compensation Program    The [facility] Vaccine Injury Compensation Program (VICP) is a federal program that was created to compensate people who may have been injured by certain vaccines. Persons who believe they may have been injured by a vaccine can learn about the program and about filing a claim by calling 1-420.966.3362 or visiting the [employer] website at www.[facility].gov/vaccinecompensation. There is a time limit to file a claim for compensation. 7. How can I learn more?  Ask your doctor. He or she can give you the vaccine package insert or suggest other sources of information.  Call your local or state health department.  Contact the Centers for Disease Control and Prevention (CDC):  - Call 4-698.608.2530 (1-800-CDC-INFO) or  - Visit CDCs website at www.cdc.gov/vaccines      Vaccine Information Statement   Tdap Vaccine  (2/24/2015)  [address] [place] 322DV-10    Department of Health and Human Services  Centers for Disease Control and Prevention    Office Use Only

## 2017-08-15 ENCOUNTER — TELEPHONE (OUTPATIENT)
Dept: FAMILY MEDICINE CLINIC | Age: 47
End: 2017-08-15

## 2017-08-15 NOTE — TELEPHONE ENCOUNTER
Call to pateint. Left message to call back office regarding medications.  Wanted to advised she can use mitral as needed for constipation

## 2017-08-23 ENCOUNTER — TELEPHONE (OUTPATIENT)
Dept: FAMILY MEDICINE CLINIC | Age: 47
End: 2017-08-23

## 2017-08-23 NOTE — TELEPHONE ENCOUNTER
Jennifer Hayden, [address] [facility]    Patient states that she feels that she is getting bacterial vaginosis. She has had that before and knows the symptoms. She is asking if Dr. Danya Frankel can get her in sometime this week? Please advise.

## 2017-08-24 ENCOUNTER — OFFICE VISIT (OUTPATIENT)
Dept: FAMILY MEDICINE CLINIC | Age: 47
End: 2017-08-24

## 2017-08-24 VITALS
HEART RATE: 66 BPM | SYSTOLIC BLOOD PRESSURE: 114 MMHG | TEMPERATURE: 98.1 F | OXYGEN SATURATION: 97 % | BODY MASS INDEX: 30.54 KG/M2 | DIASTOLIC BLOOD PRESSURE: 70 MMHG | HEIGHT: 67 IN | WEIGHT: 194.6 LBS | RESPIRATION RATE: 18 BRPM

## 2017-08-24 DIAGNOSIS — K59.09 CHRONIC CONSTIPATION: ICD-10-CM

## 2017-08-24 DIAGNOSIS — N76.0 BACTERIAL VAGINOSIS: Primary | ICD-10-CM

## 2017-08-24 DIAGNOSIS — B96.89 BACTERIAL VAGINOSIS: Primary | ICD-10-CM

## 2017-08-24 DIAGNOSIS — Z12.39 BREAST CANCER SCREENING: ICD-10-CM

## 2017-08-24 RX ORDER — METRONIDAZOLE 500 MG/1
500 TABLET ORAL 2 TIMES DAILY
Qty: 14 TAB | Refills: 0 | Status: SHIPPED | OUTPATIENT
Start: 2017-08-24 | End: 2017-08-31

## 2017-08-24 NOTE — PATIENT INSTRUCTIONS
Look up RepHresh probiotic for feminine care  Take 1 pill daily long-term    Ask your mom if she had colon polyps on her colonoscopy--if she did, call me so I can refer you for colonoscopy           Bacterial Vaginosis: Care Instructions  Your Care Instructions    Bacterial vaginosis is a type of vaginal infection. It is caused by excess growth of certain bacteria that are normally found in the vagina. Symptoms can include itching, swelling, pain when you urinate or have sex, and a gray or yellow discharge with a \"fishy\" odor. It is not considered an infection that is spread through sexual contact. Although symptoms can be annoying and uncomfortable, bacterial vaginosis does not usually cause other health problems. However, if you have it while you are pregnant, it can cause complications. While the infection may go away on its own, most doctors use antibiotics to treat it. You may have been prescribed pills or vaginal cream. With treatment, bacterial vaginosis usually clears up in 5 to 7 days. Follow-up care is a key part of your treatment and safety. Be sure to make and go to all appointments, and call your doctor if you are having problems. It's also a good idea to know your test results and keep a list of the medicines you take. How can you care for yourself at home? · Take your antibiotics as directed. Do not stop taking them just because you feel better. You need to take the full course of antibiotics. · Do not eat or drink anything that contains alcohol if you are taking metronidazole (Flagyl). · Keep using your medicine if you start your period. Use pads instead of tampons while using a vaginal cream or suppository. Tampons can absorb the medicine. · Wear loose cotton clothing. Do not wear nylon and other materials that hold body heat and moisture close to the skin. · Do not scratch. Relieve itching with a cold pack or a cool bath. · Do not wash your vaginal area more than once a day.  Use plain water or a mild, unscented soap. Do not douche. When should you call for help? Watch closely for changes in your health, and be sure to contact your doctor if:  · You have unexpected vaginal bleeding. · You have a fever. · You have new or increased pain in your vagina or pelvis. · You are not getting better after 1 week. · Your symptoms return after you finish the course of your medicine. Where can you learn more? Go to http://darcie-jessi.info/. Yoan Pennington in the search box to learn more about \"Bacterial Vaginosis: Care Instructions. \"  Current as of: October 13, 2016  Content Version: 11.3  © 9596-8158 [employer]. Care instructions adapted under license by [employer] (which disclaims liability or warranty for this information). If you have questions about a medical condition or this instruction, always ask your healthcare professional. [address] any warranty or liability for your use of this information.

## 2017-08-24 NOTE — TELEPHONE ENCOUNTER
Call to patient.  verified. Patient c/o clear discharge with \"funny smell\" noticed about a week ago hasn't gotten better. Patient would like to rule out bacterial vaginosis as she states she usually gets this.     Scheduled with Carlos Ladd MD at 11am 17

## 2017-08-24 NOTE — PROGRESS NOTES
Juan Daniel Ng [address] [facility]  [id number] [place] [address] [address] [facility], [address]  [phone]             Date of visit: 2017   Subjective:      History obtained from:  patient. Taylor Rao is a 41 y.o. female who presents today for same vaginal odor she always has with BV, has it several times per year. Had testing showing BV 2 mo ago, flagyl worked at that time so would like to take it again. Steady partner,  Not concerned about STDs. Denies itching. Asks to schedule mammogram, likes to get that at her age due to family history    Also has chronic constipation. linzess worked but got too expensive, insurance didn't cover. Epsom salts working pretty well. Tries to eat high fiber and drink plenty of water    Patient Active Problem List    Diagnosis Date Noted    Chronic constipation 2017    HTN, goal below 140/90 06/15/2017    Vitamin D deficiency 06/15/2017    Depression 06/15/2017     Current Outpatient Prescriptions   Medication Sig Dispense Refill    metroNIDAZOLE (FLAGYL) 500 mg tablet Take 1 Tab by mouth two (2) times a day for 7 days. For bacterial vaginosis 14 Tab 0    omeprazole (PRILOSEC) 40 mg capsule Take 40 mg by mouth daily.  cholecalciferol, vitamin D3, (VITAMIN D3) 2,000 unit tab Take  by mouth.  citalopram (CELEXA) 40 mg tablet Take 1 Tab by mouth daily. 30 Tab 1    hydroCHLOROthiazide (HYDRODIURIL) 25 mg tablet Take 1 Tab by mouth daily. 30 Tab 1    lisinopril (PRINIVIL, ZESTRIL) 10 mg tablet Take 1 Tab by mouth daily.  30 Tab 1     Allergies   Allergen Reactions    Propofol Other (comments)     Patient states she has a difficult time waking up     Past Medical History:   Diagnosis Date    Depression     HTN, goal below 140/90     Hypercholesterolemia      Past Surgical History:   Procedure Laterality Date    HX  SECTION  1992, , 2009    HX COLONOSCOPY  2002    approx date    HX LAPAROTOMY       Family History   Problem Relation Age of Onset    Drug Abuse Mother      cocaine    Depression Mother     Hypertension Mother     Drug Abuse Father      heroin    Cancer Maternal Grandmother      esophageal cancer    Colon Cancer Maternal Grandfather      in his 62s    Prostate Cancer Maternal Grandfather     Cancer Paternal Grandmother      brain, not sure of primary    Prostate Cancer Paternal Grandfather     Lupus Maternal Aunt      2 aunts with lupus     Social History   Substance Use Topics    Smoking status: Current Some Day Smoker    Smokeless tobacco: Never Used      Comment: cigar    Alcohol use Yes      Comment: occasional      Social History     Social History Narrative        Review of Systems  Gen: denies fever      Objective:     Vitals:    08/24/17 1122   BP: 114/70   Pulse: 66   Resp: 18   Temp: 98.1 °F (36.7 °C)   TempSrc: Oral   SpO2: 97%   Weight: 194 lb 9.6 oz (88.3 kg)   Height: 5' 7\" (1.702 m)     Body mass index is 30.48 kg/(m^2). General: stated age, well developed, well nourished and in NAD  Psych: alert and oriented to person, place, time and situation and Speech: appropriate quality, quantity and organization of sentences      Assessment/Plan:       ICD-10-CM ICD-9-CM    1. Bacterial vaginosis N76.0 616.10     B96.89 041.9    2. Chronic constipation K59.09 564.00    3.  Breast cancer screening Z12.39 V76.10 [facility] MAMMO BI SCREENING INCL CAD        Orders Placed This Encounter    [facility] MAMMO BI SCREENING INCL CAD    metroNIDAZOLE (FLAGYL) 500 mg tablet       Classic BV symptoms back, will not test due to cost of testing and recently had BV on nuswab  Treat with flagyl  Advised to try rephresh probiotics, may possibly help  Discussed that treating her partner may possibly help  Of course standard prevention measures like cotton underwear and not douching    Constipation doing pretty well with epsom salt, continue, also lots of water/fiber  Refer mammogram as requested    Discussed the diagnosis and plan and she expressed understanding. Follow-up Disposition:  Return if symptoms worsen or fail to improve.     Demetri Aleman MD

## 2017-08-24 NOTE — MR AVS SNAPSHOT
Visit Information Date & Time Provider Department Dept. Phone Encounter #  
 8/24/2017 11:00 AM Douglas Diaz, [address] Count includes the [facility] [address] [phone] [id number] Your Appointments 11/3/2017  3:30 PM  
ROUTINE CARE with Robert Galvan MD  
[facility]) Appt Note: 3 month f/u  
 [address] [address]  
[phone]  
  
   
 [address] [address] Upcoming Health Maintenance Date Due Pneumococcal 19-64 Medium Risk (1 of 1 - PPSV23) 5/26/1991 INFLUENZA AGE 9 TO ADULT 8/1/2017 PAP AKA CERVICAL CYTOLOGY 6/15/2022 DTaP/Tdap/Td series (2 - Td) 8/3/2027 Allergies as of 8/24/2017  Review Complete On: 8/24/2017 By: Douglas Diaz MD  
  
 Severity Noted Reaction Type Reactions Propofol  06/15/2017    Other (comments) Patient states she has a difficult time waking up Current Immunizations  Reviewed on 8/3/2017 Name Date Tdap 8/3/2017 Not reviewed this visit You Were Diagnosed With   
  
 Codes Comments Bacterial vaginosis    -  Primary ICD-10-CM: N76.0, B96.89 
ICD-9-CM: 616.10, 041.9 Chronic constipation     ICD-10-CM: K59.09 
ICD-9-CM: 564.00 Breast cancer screening     ICD-10-CM: Z12.39 
ICD-9-CM: V76.10 Vitals BP Pulse Temp Resp Height(growth percentile) Weight(growth percentile) 114/70 (BP 1 Location: Left arm, BP Patient Position: Sitting) 66 98.1 °F (36.7 °C) (Oral) 18 5' 7\" (1.702 m) 194 lb 9.6 oz (88.3 kg) LMP SpO2 BMI OB Status Smoking Status 08/08/2017 (Approximate) 97% 30.48 kg/m2 Having regular periods Current Some Day Smoker Vitals History BMI and BSA Data Body Mass Index Body Surface Area  
 30.48 kg/m 2 2.04 m 2 Preferred Pharmacy Pharmacy Name Phone [id number] [place], [address] [phone] Your Updated Medication List  
  
   
This list is accurate as of: 8/24/17 11:55 AM.  Always use your most recent med list.  
  
  
  
  
 citalopram 40 mg tablet Commonly known as:  Kaushik Campanile Take 1 Tab by mouth daily. hydroCHLOROthiazide 25 mg tablet Commonly known as:  HYDRODIURIL Take 1 Tab by mouth daily. lisinopril 10 mg tablet Commonly known as:  Yaimlex Flight Take 1 Tab by mouth daily. metroNIDAZOLE 500 mg tablet Commonly known as:  FLAGYL Take 1 Tab by mouth two (2) times a day for 7 days. For bacterial vaginosis PriLOSEC 40 mg capsule Generic drug:  omeprazole Take 40 mg by mouth daily. VITAMIN D3 2,000 unit Tab Generic drug:  cholecalciferol (vitamin D3) Take  by mouth. Prescriptions Sent to Pharmacy Refills  
 metroNIDAZOLE (FLAGYL) 500 mg tablet 0 Sig: Take 1 Tab by mouth two (2) times a day for 7 days. For bacterial vaginosis Class: Normal  
 Pharmacy: [facility]/pharmacy #[id number] Ph #: [phone] Route: Oral  
  
To-Do List   
 08/24/2017 Imaging:  [facility] MAMMO BI SCREENING INCL CAD Patient Instructions Look up RepHresh probiotic for feminine care Take 1 pill daily long-term Ask your mom if she had colon polyps on her colonoscopy--if she did, call me so I can refer you for colonoscopy Bacterial Vaginosis: Care Instructions Your Care Instructions Bacterial vaginosis is a type of vaginal infection. It is caused by excess growth of certain bacteria that are normally found in the vagina. Symptoms can include itching, swelling, pain when you urinate or have sex, and a gray or yellow discharge with a \"fishy\" odor. It is not considered an infection that is spread through sexual contact. Although symptoms can be annoying and uncomfortable, bacterial vaginosis does not usually cause other health problems.  However, if you have it while you are pregnant, it can cause complications. While the infection may go away on its own, most doctors use antibiotics to treat it. You may have been prescribed pills or vaginal cream. With treatment, bacterial vaginosis usually clears up in 5 to 7 days. Follow-up care is a key part of your treatment and safety. Be sure to make and go to all appointments, and call your doctor if you are having problems. It's also a good idea to know your test results and keep a list of the medicines you take. How can you care for yourself at home? · Take your antibiotics as directed. Do not stop taking them just because you feel better. You need to take the full course of antibiotics. · Do not eat or drink anything that contains alcohol if you are taking metronidazole (Flagyl). · Keep using your medicine if you start your period. Use pads instead of tampons while using a vaginal cream or suppository. Tampons can absorb the medicine. · Wear loose cotton clothing. Do not wear nylon and other materials that hold body heat and moisture close to the skin. · Do not scratch. Relieve itching with a cold pack or a cool bath. · Do not wash your vaginal area more than once a day. Use plain water or a mild, unscented soap. Do not douche. When should you call for help? Watch closely for changes in your health, and be sure to contact your doctor if: 
· You have unexpected vaginal bleeding. · You have a fever. · You have new or increased pain in your vagina or pelvis. · You are not getting better after 1 week. · Your symptoms return after you finish the course of your medicine. Where can you learn more? Go to http://darcie-jessi.info/. Kristen Burkett in the search box to learn more about \"Bacterial Vaginosis: Care Instructions. \" Current as of: October 13, 2016 Content Version: 11.3 © 0064-3370 [facility].  Care instructions adapted under license by [address] (which disclaims liability or warranty for this information). If you have questions about a medical condition or this instruction, always ask your healthcare professional. [address] any warranty or liability for your use of this information. Introducing [facility]! Ruth Hardwick introduces Coverity patient portal. Now you can access parts of your medical record, email your doctor's office, and request medication refills online. 1. In your internet browser, go to [email or web address] 2. Click on the First Time User? Click Here link in the Sign In box. You will see the New Member Sign Up page. 3. Enter your Coverity Access Code exactly as it appears below. You will not need to use this code after youve completed the sign-up process. If you do not sign up before the expiration date, you must request a new code. · Coverity Access Code: [email or web address] Expires: 9/13/2017 10:44 AM 
 
4. Enter the last four digits of your Social Security Number (xxxx) and Date of Birth (mm/dd/yyyy) as indicated and click Submit. You will be taken to the next sign-up page. 5. Create a Coverity ID. This will be your Coverity login ID and cannot be changed, so think of one that is secure and easy to remember. 6. Create a Coverity password. You can change your password at any time. 7. Enter your Password Reset Question and Answer. This can be used at a later time if you forget your password. 8. Enter your e-mail address. You will receive e-mail notification when new information is available in [address] 9. Click Sign Up. You can now view and download portions of your medical record. 10. Click the Download Summary menu link to download a portable copy of your medical information. If you have questions, please visit the Frequently Asked Questions section of the Coverity website.  Remember, Coverity is NOT to be used for urgent needs. For medical emergencies, dial 911. Now available from your iPhone and Android! Please provide this summary of care documentation to your next provider. Your primary care clinician is listed as Rico Pelletier. If you have any questions after today's visit, please call [phone].

## 2017-08-24 NOTE — PROGRESS NOTES
Chief Complaint   Patient presents with    Vaginal Discharge     vaginal discharge with odor X 1 week      1. Have you been to the ER, urgent care clinic since your last visit? Hospitalized since your last visit? No    2. Have you seen or consulted any other health care providers outside of the [address] [place] [address] since your last visit? Include any pap smears or colon screening.  No

## 2017-10-16 DIAGNOSIS — I10 HTN, GOAL BELOW 140/90: ICD-10-CM

## 2017-10-16 DIAGNOSIS — F32.A DEPRESSION, UNSPECIFIED DEPRESSION TYPE: ICD-10-CM

## 2017-10-16 RX ORDER — CITALOPRAM 40 MG/1
TABLET, FILM COATED ORAL
Qty: 90 TAB | Refills: 0 | Status: SHIPPED | OUTPATIENT
Start: 2017-10-16 | End: 2018-02-10 | Stop reason: SDUPTHER

## 2017-10-16 RX ORDER — LISINOPRIL 10 MG/1
TABLET ORAL
Qty: 90 TAB | Refills: 0 | Status: SHIPPED | OUTPATIENT
Start: 2017-10-16 | End: 2018-02-10 | Stop reason: SDUPTHER

## 2017-10-16 RX ORDER — HYDROCHLOROTHIAZIDE 25 MG/1
TABLET ORAL
Qty: 90 TAB | Refills: 0 | Status: SHIPPED | OUTPATIENT
Start: 2017-10-16 | End: 2018-02-10 | Stop reason: SDUPTHER

## 2017-10-16 NOTE — TELEPHONE ENCOUNTER
Pharmacy sent a fax requesting 90 day supply of the following medication. Pharmacy on file verified.     Requested Prescriptions     Pending Prescriptions Disp Refills    hydroCHLOROthiazide (HYDRODIURIL) 25 mg tablet 30 Tab 2    citalopram (CELEXA) 40 mg tablet 30 Tab 2    lisinopril (PRINIVIL, ZESTRIL) 10 mg tablet 30 Tab 2

## 2018-02-10 DIAGNOSIS — I10 HTN, GOAL BELOW 140/90: ICD-10-CM

## 2018-02-10 DIAGNOSIS — F32.A DEPRESSION, UNSPECIFIED DEPRESSION TYPE: ICD-10-CM

## 2018-02-10 RX ORDER — LISINOPRIL 10 MG/1
TABLET ORAL
Qty: 30 TAB | Refills: 0 | Status: SHIPPED | OUTPATIENT
Start: 2018-02-10 | End: 2018-03-02 | Stop reason: SDUPTHER

## 2018-02-10 RX ORDER — HYDROCHLOROTHIAZIDE 25 MG/1
TABLET ORAL
Qty: 30 TAB | Refills: 0 | Status: SHIPPED | OUTPATIENT
Start: 2018-02-10 | End: 2018-03-02 | Stop reason: SDUPTHER

## 2018-02-10 RX ORDER — CITALOPRAM 40 MG/1
TABLET, FILM COATED ORAL
Qty: 30 TAB | Refills: 0 | Status: SHIPPED | OUTPATIENT
Start: 2018-02-10 | End: 2018-03-02 | Stop reason: SDUPTHER

## 2018-02-19 ENCOUNTER — OP HISTORICAL/CONVERTED ENCOUNTER (OUTPATIENT)
Dept: OTHER | Age: 48
End: 2018-02-19

## 2018-02-22 ENCOUNTER — OFFICE VISIT (OUTPATIENT)
Dept: NEUROLOGY | Age: 48
End: 2018-02-22

## 2018-02-22 ENCOUNTER — OFFICE VISIT (OUTPATIENT)
Dept: FAMILY MEDICINE CLINIC | Age: 48
End: 2018-02-22

## 2018-02-22 VITALS
SYSTOLIC BLOOD PRESSURE: 153 MMHG | HEART RATE: 79 BPM | WEIGHT: 192 LBS | RESPIRATION RATE: 18 BRPM | HEIGHT: 67 IN | TEMPERATURE: 98.2 F | DIASTOLIC BLOOD PRESSURE: 106 MMHG | OXYGEN SATURATION: 96 % | BODY MASS INDEX: 30.13 KG/M2

## 2018-02-22 VITALS
HEIGHT: 67 IN | HEART RATE: 72 BPM | DIASTOLIC BLOOD PRESSURE: 90 MMHG | SYSTOLIC BLOOD PRESSURE: 190 MMHG | WEIGHT: 192 LBS | OXYGEN SATURATION: 98 % | BODY MASS INDEX: 30.13 KG/M2 | RESPIRATION RATE: 14 BRPM

## 2018-02-22 DIAGNOSIS — G24.3 ISOLATED CERVICAL DYSTONIA: Primary | ICD-10-CM

## 2018-02-22 DIAGNOSIS — R26.9 GAIT DIFFICULTY: ICD-10-CM

## 2018-02-22 DIAGNOSIS — I10 HTN, GOAL BELOW 140/90: ICD-10-CM

## 2018-02-22 DIAGNOSIS — R25.1 TREMOR: ICD-10-CM

## 2018-02-22 DIAGNOSIS — R47.81 SLURRED SPEECH: ICD-10-CM

## 2018-02-22 DIAGNOSIS — I10 HYPERTENSION, UNSPECIFIED TYPE: ICD-10-CM

## 2018-02-22 DIAGNOSIS — R47.89 OTHER SPEECH DISTURBANCE: ICD-10-CM

## 2018-02-22 DIAGNOSIS — R25.1 TREMOR: Primary | ICD-10-CM

## 2018-02-22 RX ORDER — ATORVASTATIN CALCIUM 40 MG/1
TABLET, FILM COATED ORAL DAILY
COMMUNITY
End: 2018-03-02

## 2018-02-22 RX ORDER — CLONAZEPAM 0.5 MG/1
0.5 TABLET ORAL
COMMUNITY
End: 2018-03-02

## 2018-02-22 RX ORDER — PREGABALIN 25 MG/1
CAPSULE ORAL
COMMUNITY
End: 2018-03-02

## 2018-02-22 RX ORDER — AMLODIPINE BESYLATE 5 MG/1
5 TABLET ORAL DAILY
COMMUNITY
End: 2018-02-22 | Stop reason: SDUPTHER

## 2018-02-22 RX ORDER — HYDRALAZINE HYDROCHLORIDE 25 MG/1
25 TABLET, FILM COATED ORAL 3 TIMES DAILY
COMMUNITY
End: 2018-02-22 | Stop reason: SDUPTHER

## 2018-02-22 RX ORDER — PANTOPRAZOLE SODIUM 40 MG/1
40 TABLET, DELAYED RELEASE ORAL DAILY
COMMUNITY
End: 2018-02-22

## 2018-02-22 NOTE — PATIENT INSTRUCTIONS

## 2018-02-22 NOTE — LETTER
NOTIFICATION RETURN TO WORK / SCHOOL 
 
2/22/2018 12:46 PM 
 
Ms. Melida Cameron [address] [facility] [address] To Whom It May Concern: 
 
Melida Cameron is currently under the care of HERBERT Lal. Please excuse patient from school 2/22/2018 to 2/26/18. If there are questions or concerns please have the patient contact our office. Sincerely, Elva Tloentino NP

## 2018-02-22 NOTE — LETTER
NOTIFICATION RETURN TO WORK / SCHOOL 
 
2/22/2018 12:44 PM 
 
Ms. Roslyn Tapia [address] [facility] [address] To Whom It May Concern: 
 
Roslyn Tapia is currently under the care of HERBERT Lal. Please excuse patient from work 2/22/2018 to 2/26/18. If there are questions or concerns please have the patient contact our office. Sincerely, Willene Angelucci, NP

## 2018-02-22 NOTE — PROGRESS NOTES
Chief Complaint   Patient presents with   [address]      1. Have you been to the ER, urgent care clinic since your last visit? Hospitalized since your last visit? Yes [facility]    2. Have you seen or consulted any other health care providers outside of the [address] [place] [address] since your last visit? Include any pap smears or colon screening.  No

## 2018-02-22 NOTE — LETTER
[address]  [address] 
[phone] Work/School Note Date: 2/22/2018 To Whom It May concern: 
 
Dior Arias was seen and treated today in the office by the following Altaf Weber MD. 
   
It is my medical opinion that she should stay off work pending further treatment. She will be reassessed in our office in 1 month Please don't hesitate to contact us if there are any questions Sincerely, Altaf Weber MD

## 2018-02-22 NOTE — PROGRESS NOTES
Patient is here for evaluation of cervical dystonia  Neck tremor and speech issues since Monday  Sudden onset on monday at work

## 2018-02-22 NOTE — MR AVS SNAPSHOT
[facility] [address] [place] [address] 
[phone] Patient: Lorenzo MRN: [id number] [facility]:[birth year] Visit Information Date & Time Provider Department Dept. Phone Encounter #  
 2/22/2018  2:00 PM Deena Goyal MD [facility] at [address] [id number] Follow-up Instructions Return in about 2 months (around 4/22/2018), or if symptoms worsen or fail to improve. Upcoming Health Maintenance Date Due Pneumococcal 19-64 Medium Risk (1 of 1 - PPSV23) 5/26/1991 Influenza Age 5 to Adult 8/1/2017 PAP AKA CERVICAL CYTOLOGY 6/15/2022 DTaP/Tdap/Td series (2 - Td) 8/3/2027 Allergies as of 2/22/2018  Review Complete On: 2/22/2018 By: Deena Goyal MD  
  
 Severity Noted Reaction Type Reactions Propofol  06/15/2017    Other (comments) Patient states she has a difficult time waking up Current Immunizations  Reviewed on 8/3/2017 Name Date Tdap 8/3/2017 Not reviewed this visit You Were Diagnosed With   
  
 Codes Comments Tremor    -  Primary ICD-10-CM: R25.1 ICD-9-CM: 781.0 Other speech disturbance     ICD-10-CM: R47.89 ICD-9-CM: 784.59 Gait difficulty     ICD-10-CM: R26.9 ICD-9-CM: 111. 2 Hypertension, unspecified type     ICD-10-CM: I10 
ICD-9-CM: 401.9 Vitals BP Pulse Resp Height(growth percentile) Weight(growth percentile) LMP  
 190/90 72 14 5' 7\" (1.702 m) 192 lb (87.1 kg) 02/01/2018 SpO2 BMI OB Status Smoking Status 98% 30.07 kg/m2 Having regular periods Current Some Day Smoker Vitals History BMI and BSA Data Body Mass Index Body Surface Area 30.07 kg/m 2 2.03 m 2 Preferred Pharmacy Pharmacy Name Phone [id number] [place], [email or web address] [phone] Your Updated Medication List  
  
   
 This list is accurate as of 2/22/18  2:41 PM.  Always use your most recent med list. amLODIPine 5 mg tablet Commonly known as:  Iqra Shahid Take 5 mg by mouth daily. atorvastatin 40 mg tablet Commonly known as:  LIPITOR Take  by mouth daily. citalopram 40 mg tablet Commonly known as:  CELEXA  
TAKE 1 TABLET BY MOUTH EVERY DAY  
  
 clonazePAM 0.5 mg tablet Commonly known as:  Zetta Ayaan Take  by mouth nightly as needed. hydrALAZINE 25 mg tablet Commonly known as:  APRESOLINE Take 25 mg by mouth three (3) times daily. hydroCHLOROthiazide 25 mg tablet Commonly known as:  HYDRODIURIL  
TAKE 1 TABLET BY MOUTH EVERY DAY  
  
 lisinopril 10 mg tablet Commonly known as:  PRINIVIL, ZESTRIL  
TAKE 1 TABLET BY MOUTH EVERY DAY  
  
 LYRICA 25 mg capsule Generic drug:  pregabalin Take  by mouth. PriLOSEC 40 mg capsule Generic drug:  omeprazole Take 40 mg by mouth daily. VITAMIN D3 2,000 unit Tab Generic drug:  cholecalciferol (vitamin D3) Take  by mouth. We Performed the Following REFERRAL TO PHYSICAL THERAPY [ZHH67 Custom] REFERRAL TO SPEECH THERAPY [CFG216 Custom] Follow-up Instructions Return in about 2 months (around 4/22/2018), or if symptoms worsen or fail to improve. Referral Information Referral ID Referred By Referred To  
  
 [id number] Jarad Lynn, PT   
   [place] [address] [place] [address] Visits Status Start Date End Date 1 New Request 2/22/18 2/22/19 If your referral has a status of pending review or denied, additional information will be sent to support the outcome of this decision. Referral ID Referred By Referred To  
 [id number] [address] Phone: [phone] Fax: [phone] Visits Status Start Date End Date 1 New Request 2/22/18 2/22/19 If your referral has a status of pending review or denied, additional information will be sent to support the outcome of this decision. Patient Instructions A Healthy Lifestyle: Care Instructions Your Care Instructions A healthy lifestyle can help you feel good, stay at a healthy weight, and have plenty of energy for both work and play. A healthy lifestyle is something you can share with your whole family. A healthy lifestyle also can lower your risk for serious health problems, such as high blood pressure, heart disease, and diabetes. You can follow a few steps listed below to improve your health and the health of your family. Follow-up care is a key part of your treatment and safety. Be sure to make and go to all appointments, and call your doctor if you are having problems. It's also a good idea to know your test results and keep a list of the medicines you take. How can you care for yourself at home? · Do not eat too much sugar, fat, or fast foods. You can still have dessert and treats now and then. The goal is moderation. · Start small to improve your eating habits. Pay attention to portion sizes, drink less juice and soda pop, and eat more fruits and vegetables. ¨ Eat a healthy amount of food. A 3-ounce serving of meat, for example, is about the size of a deck of cards. Fill the rest of your plate with vegetables and whole grains. ¨ Limit the amount of soda and sports drinks you have every day. Drink more water when you are thirsty. ¨ Eat at least 5 servings of fruits and vegetables every day. It may seem like a lot, but it is not hard to reach this goal. A serving or helping is 1 piece of fruit, 1 cup of vegetables, or 2 cups of leafy, raw vegetables. Have an apple or some carrot sticks as an afternoon snack instead of a candy bar.  Try to have fruits and/or vegetables at every meal. 
 · Make exercise part of your daily routine. You may want to start with simple activities, such as walking, bicycling, or slow swimming. Try to be active 30 to 60 minutes every day. You do not need to do all 30 to 60 minutes all at once. For example, you can exercise 3 times a day for 10 or 20 minutes. Moderate exercise is safe for most people, but it is always a good idea to talk to your doctor before starting an exercise program. 
· Keep moving. Marilou Silence the lawn, work in the garden, or [email or web address].CH. Take the stairs instead of the elevator at work. · If you smoke, quit. People who smoke have an increased risk for heart attack, stroke, cancer, and other lung illnesses. Quitting is hard, but there are ways to boost your chance of quitting tobacco for good. ¨ Use nicotine gum, patches, or lozenges. ¨ Ask your doctor about stop-smoking programs and medicines. ¨ Keep trying. In addition to reducing your risk of diseases in the future, you will notice some benefits soon after you stop using tobacco. If you have shortness of breath or asthma symptoms, they will likely get better within a few weeks after you quit. · Limit how much alcohol you drink. Moderate amounts of alcohol (up to 2 drinks a day for men, 1 drink a day for women) are okay. But drinking too much can lead to liver problems, high blood pressure, and other health problems. Family health If you have a family, there are many things you can do together to improve your health. · Eat meals together as a family as often as possible. · Eat healthy foods. This includes fruits, vegetables, lean meats and dairy, and whole grains. · Include your family in your fitness plan. Most people think of activities such as jogging or tennis as the way to fitness, but there are many ways you and your family can be more active. Anything that makes you breathe hard and gets your heart pumping is exercise. Here are some tips: ¨ Walk to do errands or to take your child to school or the bus. ¨ Go for a family bike ride after dinner instead of watching TV. Where can you learn more? Go to http://darcie-jessi.info/. Enter R132 in the search box to learn more about \"A Healthy Lifestyle: Care Instructions. \" Current as of: May 12, 2017 Content Version: 11.4 © 8031-2448 makemoji. Care instructions adapted under license by [employer] (which disclaims liability or warranty for this information). If you have questions about a medical condition or this instruction, always ask your healthcare professional. [address] any warranty or liability for your use of this information. Introducing [address]! [facility] introduces The Muse patient portal. Now you can access parts of your medical record, email your doctor's office, and request medication refills online. 1. In your internet browser, go to [email or web address] 2. Click on the First Time User? Click Here link in the Sign In box. You will see the New Member Sign Up page. 3. Enter your The Muse Access Code exactly as it appears below. You will not need to use this code after youve completed the sign-up process. If you do not sign up before the expiration date, you must request a new code. · The Muse Access Code: [email or web address] Expires: 5/23/2018 11:12 AM 
 
4. Enter the last four digits of your Social Security Number (xxxx) and Date of Birth (mm/dd/yyyy) as indicated and click Submit. You will be taken to the next sign-up page. 5. Create a Applied StemCellt ID. This will be your The Muse login ID and cannot be changed, so think of one that is secure and easy to remember. 6. Create a Applied StemCellt password. You can change your password at any time. 7. Enter your Password Reset Question and Answer. This can be used at a later time if you forget your password. 8. Enter your e-mail address. You will receive e-mail notification when new information is available in [address] 9. Click Sign Up. You can now view and download portions of your medical record. 10. Click the Download Summary menu link to download a portable copy of your medical information. If you have questions, please visit the Frequently Asked Questions section of the Mobicow website. Remember, Mobicow is NOT to be used for urgent needs. For medical emergencies, dial 911. Now available from your iPhone and Android! Please provide this summary of care documentation to your next provider. Your primary care clinician is listed as Rico Pelletier. If you have any questions after today's visit, please call [phone].

## 2018-02-22 NOTE — PROGRESS NOTES
NEUROLOGY NEW PATIENT CONSULTATION    REFERRED BY:  Armando Bourgeois MD  NAME: Beni Way   :  1970   MRN:  [id number]   DATE:  2018    CC: Tremor     HISTORY OF PRESENT ILLNESS    HISTORY PROVIDED BY:  Patient  Family Member: Daughter and mother        Beni Way is a 41 y.o. female who I am asked to see in consultation for tremor, speech difficulty, gait difficulty    Patient, daughter and patient's mother state that her symptoms started 4 days ago. Patient was walking out of work which was between 4:15 PM and 4:30 PM when there was an sudden onset of the symptoms. The tremors involve her head and neck. She has not recognized any particular aggravating or alleviating factors. They are not associated with facial weakness or sensory deficits in the face. They happen intermittently with periods of worsening and improvement. This is also associated with some slow speech which is not well enunciated. Again this is variable, no specific aggravating or relieving factors. She says that when her tremors get really worse it is difficult for her to walk. She also had a chest pain at the onset of this. She was admitted and evaluated at an outside facility and I was able to review the discharge summary. Her discharge summary had a MRI was negative. She also had carotid Dopplers done. Initially it was believed that she may have had a TIA but that was not the discharge diagnosis. Her chest pain resolved. Patient states that she was told that this was a stress reaction. Patient has had some off-and-on headache throughout this episode. It has been mild to moderate in severity, holoacranial, throbbing.   No fevers, neck stiffness, rashes    She states that there is unusual stress in her life related to work and finances    PMH  Past Medical History:   Diagnosis Date    Depression     Headache     HTN, goal below 140/90     Hypercholesterolemia      Past Surgical History:   Procedure Laterality Date    HX  SECTION  1992, ,     HX COLONOSCOPY  2002    approx date    HX LAPAROTOMY         [address]  Social History     Social History    Marital status:      Spouse name: N/A    Number of children: N/A    Years of education: N/A     Social History Main Topics    Smoking status: Current Some Day Smoker    Smokeless tobacco: Never Used      Comment: cigar    Alcohol use Yes      Comment: occasional    Drug use: No    Sexual activity: Yes     Other Topics Concern    None     Social History Narrative       FH  Family History   Problem Relation Age of Onset    Drug Abuse Mother      cocaine    Depression Mother     Hypertension Mother     Drug Abuse Father      heroin    Cancer Maternal Grandmother      esophageal cancer    Colon Cancer Maternal Grandfather      in his 62s    Prostate Cancer Maternal Grandfather     Cancer Paternal Grandmother      brain, not sure of primary    Prostate Cancer Paternal Grandfather     Lupus Maternal Aunt      2 aunts with lupus       ALLERGIES  Allergies   Allergen Reactions    Propofol Other (comments)     Patient states she has a difficult time waking up       CURRENT MEDS  Current Outpatient Prescriptions   Medication Sig Dispense Refill    amLODIPine (NORVASC) 5 mg tablet Take 5 mg by mouth daily.  hydrALAZINE (APRESOLINE) 25 mg tablet Take 25 mg by mouth three (3) times daily.  atorvastatin (LIPITOR) 40 mg tablet Take  by mouth daily.  pregabalin (LYRICA) 25 mg capsule Take  by mouth.  clonazePAM (KLONOPIN) 0.5 mg tablet Take  by mouth nightly as needed.  citalopram (CELEXA) 40 mg tablet TAKE 1 TABLET BY MOUTH EVERY DAY 30 Tab 0    lisinopril (PRINIVIL, ZESTRIL) 10 mg tablet TAKE 1 TABLET BY MOUTH EVERY DAY 30 Tab 0    hydroCHLOROthiazide (HYDRODIURIL) 25 mg tablet TAKE 1 TABLET BY MOUTH EVERY DAY 30 Tab 0    omeprazole (PRILOSEC) 40 mg capsule Take 40 mg by mouth daily.       cholecalciferol, vitamin D3, (VITAMIN D3) 2,000 unit tab Take  by mouth. REVIEW OF SYSTEMS:   Detailed review of systems was obtained and scanned back to the encounter. ROS reviewed at time of visit by Delores Watt MD       PREVIOUS WORKUP  IMAGING  MRI Results (most recent):  2/20/2018 from outside facility is reported as normal MRI of the brain    LABS  Results for orders placed or performed in visit on 08/03/17   AMB POC URINE PREGNANCY TEST, VISUAL COLOR COMPARISON   Result Value Ref Range    VALID INTERNAL CONTROL POC Yes     HCG urine, Ql. (POC) Negative Negative     TSH is 3.36, sodium 136, potassium 3.8, BUN 10, creatinine 0.82, glucose 95, WBC 4.4, hemoglobin 12.6    PHYSICAL EXAM  Visit Vitals    /90    Pulse 72    Resp 14    Ht 5' 7\" (1.702 m)    Wt 87.1 kg (192 lb)    LMP 02/01/2018    SpO2 98%    BMI 30.07 kg/m2     General:  Alert, cooperative, no distress. Head:  Normocephalic, without obvious abnormality, atraumatic. Eyes:  Conjunctivae/corneas clear. No icterus   Lungs:  Heart:   Non labored breathing  Regular rate and rhythm, no carotid bruits       Extremities: Extremities normal, atraumatic, no cyanosis or edema. Pulses: 2+ radial pulses   Skin: Skin color, texture, turgor normal.   Neurologic:  Gen: Attention normal             Language: naming, repetition,normal.  She speaks slowly, her words are not well enunciated.   She has some hesitancy, stuttering as well             Memory: intact recent and remote memory  Cranial Nerves:  I: smell Not tested   II: visual fields Full to confrontation   II: pupils Equal, round, reactive to light   II: optic disc  attempted   III,VII: ptosis none   III,IV,VI: extraocular muscles  Full ROM       V: facial light touch sensation  normal   VII: facial muscle function   symmetric   VIII: hearing symmetric   IX: soft palate elevation  normal   XI: trapezius strength  5/5   XII: tongue  midline     Motor: normal bulk and tone, there is tremor of the head and neck which is variable in amplitude and direction and improves on distraction maneuvers              Strength: 5/5 all four extremities  Sensory: intact to LT, PP  Coordination: FTN intact, Rhomberg negative  Gait: normal gait  Reflexes: 2+ throughout       IMPRESSION, PLAN AND RECOMMENDATIONS  Jessica Wihteside is a 41 y.o. female who is being seen in consultation for      ICD-10-CM ICD-9-CM    1. Tremor R25.1 781.0    2. Other speech disturbance R47.89 784.59 REFERRAL TO SPEECH THERAPY   3. Gait difficulty R26.9 781.2 REFERRAL TO PHYSICAL THERAPY   4. Hypertension, unspecified type I10 401.9      Patient symptoms and examination do not necessarily suggest a specific neurologic syndrome. There is unusual stress in her life and that could be a trigger for these symptoms. Nevertheless, she has had extensive workup including MRI, CT, carotid Dopplers and even an EEG. The MRI and CT have been unremarkable but I do not have the other reports. Patient and her family will try to get those reports. I explained my impression to the patient and family extensively. I will refer her to speech and physical therapy to help with her symptoms    She does have hypertension and potentially that could be leading to a headache and/or triggering a migraine type phenomenon. She is working closely with her PCP on this front and is going to monitor her blood pressure twice a day and see the PCP back in 1 week. She has been started on new antihypertensive medications during her hospital stay    At the patient's request I provided her a work note to stay off of work pending further evaluation by the therapists up until she comes back and sees me in a month. I advised that in case of any worsening or new symptoms she should seek immediate medical attention.   Patient states understanding  Johnathan Monk MD

## 2018-02-22 NOTE — PROGRESS NOTES
Assessment/Plan:     Diagnoses and all orders for this visit:    1. Isolated cervical dystonia  Appointment made with Dr. Bryce Dejesus, neurology, for immediate evaluation at 1:30 PM today. She will hold Lyrica and Klonopin which were added by the hospital evaluation until further evaluation with neurology. 2. Tremor    3. Slurred speech  MRI negative for CVA. 4. HTN, goal below 140/90  She will continue with current therapy and monitor blood pressure for 140/90 or less. She will hold hydralazine for low blood pressures. She will follow-up in 1 week with Dr. Lizbet Arreguin for blood pressure check. Follow-up Disposition:  Return in about 1 week (around 3/1/2018) for Follow Up. Discussed expected course/resolution/complications of diagnosis in detail with patient.    Medication risks/benefits/costs/interactions/alternatives discussed with patient.    Pt was given after visit summary which includes diagnoses, current medications & vitals. Pt expressed understanding with the diagnosis and plan          Subjective:      Josselyn Chavez is a 41 y.o. female who presents for had concerns including Hospital Follow Up. Hospital Follow Up  Josselyn Chavez is seen for follow up from recent admission to [facility] on 2/20/2018. We reviewed the the notes. She presented with \"feeling funny\" and tremor including difficulty with speech. EEG was unremarkable. Labs were normal, other than elevated CK in the 800s. MRI Brain was unremarkable. Carotid Doppler was negative. She did not take her clonazepam and lyrica as directed. She reports symptoms are the same. She is accompanied by her family today who reports significant concern regarding her the severity of her symptoms. She continues to struggle with tremor of the neck and upper extremities. She reports a pulling sensation of the head to the right and back. This has caused muscle pain.   It was thought to be anxiety related secondary to her evaluation from [facility]. The patient is concerned regarding her continued symptoms and does not feel her anxiety is the cause. She would like further follow-up. She continues with difficulty with speech, slurred speech, and slowed speech. Reports mentation is normal.  Denies any difficulty with recollection or thought processes. Reports tremor is stable at rest.    She was advised to start cholesterol medication which she has not at this time. History of social tobacco use. Current Outpatient Prescriptions   Medication Sig Dispense Refill    amLODIPine (NORVASC) 5 mg tablet Take 5 mg by mouth daily.  hydrALAZINE (APRESOLINE) 25 mg tablet Take 25 mg by mouth three (3) times daily.  atorvastatin (LIPITOR) 40 mg tablet Take  by mouth daily.  pregabalin (LYRICA) 25 mg capsule Take  by mouth.  citalopram (CELEXA) 40 mg tablet TAKE 1 TABLET BY MOUTH EVERY DAY 30 Tab 0    lisinopril (PRINIVIL, ZESTRIL) 10 mg tablet TAKE 1 TABLET BY MOUTH EVERY DAY 30 Tab 0    hydroCHLOROthiazide (HYDRODIURIL) 25 mg tablet TAKE 1 TABLET BY MOUTH EVERY DAY 30 Tab 0    omeprazole (PRILOSEC) 40 mg capsule Take 40 mg by mouth daily.  clonazePAM (KLONOPIN) 0.5 mg tablet Take  by mouth nightly as needed.  cholecalciferol, vitamin D3, (VITAMIN D3) 2,000 unit tab Take  by mouth. Allergies   Allergen Reactions    Propofol Other (comments)     Patient states she has a difficult time waking up       ROS:   Complete review of systems was reviewed with pertinent information listed in HPI. Objective:     Visit Vitals    BP (!) 153/106 (BP 1 Location: Left arm, BP Patient Position: Sitting)    Pulse 79    Temp 98.2 °F (36.8 °C) (Oral)    Resp 18    Ht 5' 7\" (1.702 m)    Wt 192 lb (87.1 kg)    LMP 02/01/2018    SpO2 96%    BMI 30.07 kg/m2       Vitals and Nurse Documentation reviewed. Physical Exam   Constitutional: No distress.    HENT:   Right Ear: Tympanic membrane is not erythematous and not bulging. No middle ear effusion. Left Ear: Tympanic membrane is not erythematous and not bulging. No middle ear effusion. Nose: No rhinorrhea. Right sinus exhibits no maxillary sinus tenderness and no frontal sinus tenderness. Left sinus exhibits no maxillary sinus tenderness and no frontal sinus tenderness. Mouth/Throat: No oropharyngeal exudate or posterior oropharyngeal erythema. Cardiovascular: S1 normal and S2 normal.  Exam reveals no gallop and no friction rub. No murmur heard. Pulmonary/Chest: Breath sounds normal. She has no wheezes. Lymphadenopathy:     She has no cervical adenopathy. Neurological: She has normal sensation, normal strength and normal reflexes. She displays tremor (upper extremities and head worse with intentional movement). She displays no weakness and facial symmetry. No cranial nerve deficit or sensory deficit. She has a normal Romberg Test. She shows no pronator drift. Gait normal.   Reflex Scores:       Patellar reflexes are 2+ on the right side and 2+ on the left side. Cervical ROM is within normal limits. At rest, head is hyperextended and tilted towards the lateral right aspect. Patient has cognitive difficulty with the function of left lateral rotation but after pause is able to complete the maneuver without limitation in ROM. Psychiatric: Mood and affect normal.   Tearful at times.

## 2018-03-02 ENCOUNTER — TELEPHONE (OUTPATIENT)
Dept: NEUROLOGY | Age: 48
End: 2018-03-02

## 2018-03-02 ENCOUNTER — OFFICE VISIT (OUTPATIENT)
Dept: FAMILY MEDICINE CLINIC | Age: 48
End: 2018-03-02

## 2018-03-02 VITALS
BODY MASS INDEX: 29.91 KG/M2 | HEART RATE: 79 BPM | DIASTOLIC BLOOD PRESSURE: 78 MMHG | HEIGHT: 67 IN | RESPIRATION RATE: 18 BRPM | WEIGHT: 190.6 LBS | OXYGEN SATURATION: 98 % | TEMPERATURE: 98.2 F | SYSTOLIC BLOOD PRESSURE: 120 MMHG

## 2018-03-02 DIAGNOSIS — I10 HTN, GOAL BELOW 140/90: ICD-10-CM

## 2018-03-02 DIAGNOSIS — G24.3 ISOLATED CERVICAL DYSTONIA: ICD-10-CM

## 2018-03-02 DIAGNOSIS — R25.1 TREMOR: Primary | ICD-10-CM

## 2018-03-02 DIAGNOSIS — K21.9 GASTROESOPHAGEAL REFLUX DISEASE, ESOPHAGITIS PRESENCE NOT SPECIFIED: ICD-10-CM

## 2018-03-02 DIAGNOSIS — F32.A DEPRESSION, UNSPECIFIED DEPRESSION TYPE: ICD-10-CM

## 2018-03-02 DIAGNOSIS — Z23 ENCOUNTER FOR IMMUNIZATION: ICD-10-CM

## 2018-03-02 RX ORDER — CITALOPRAM 20 MG/1
20 TABLET, FILM COATED ORAL DAILY
Qty: 90 TAB | Refills: 0 | Status: SHIPPED | OUTPATIENT
Start: 2018-03-02 | End: 2018-06-07 | Stop reason: SDUPTHER

## 2018-03-02 RX ORDER — AMLODIPINE BESYLATE 10 MG/1
10 TABLET ORAL DAILY
Qty: 90 TAB | Refills: 0 | Status: SHIPPED | OUTPATIENT
Start: 2018-03-02 | End: 2018-06-07 | Stop reason: SDUPTHER

## 2018-03-02 RX ORDER — ACETAMINOPHEN 500 MG
500 TABLET ORAL
COMMUNITY
End: 2018-08-24

## 2018-03-02 RX ORDER — LISINOPRIL 10 MG/1
TABLET ORAL
Qty: 90 TAB | Refills: 0 | Status: SHIPPED | OUTPATIENT
Start: 2018-03-02 | End: 2018-04-30

## 2018-03-02 RX ORDER — HYDROCHLOROTHIAZIDE 25 MG/1
TABLET ORAL
Qty: 90 TAB | Refills: 0 | Status: SHIPPED | OUTPATIENT
Start: 2018-03-02 | End: 2018-06-07 | Stop reason: SDUPTHER

## 2018-03-02 RX ORDER — PANTOPRAZOLE SODIUM 40 MG/1
TABLET, DELAYED RELEASE ORAL
Qty: 90 TAB | Refills: 0 | Status: SHIPPED | OUTPATIENT
Start: 2018-03-02 | End: 2018-08-24

## 2018-03-02 NOTE — PROGRESS NOTES
Chief Complaint   Patient presents with    Blood Pressure Check     follow up       1. Have you been to the ER, urgent care clinic since your last visit? Hospitalized since your last visit? No    2. Have you seen or consulted any other health care providers outside of the [address] [place] [address] since your last visit? Include any pap smears or colon screening.  No

## 2018-03-02 NOTE — TELEPHONE ENCOUNTER
I called pt but she was busy and asked if she could call us back. Told her to call us when she could.

## 2018-03-02 NOTE — TELEPHONE ENCOUNTER
----- Message from April Gibson sent at 3/2/2018 11:21 AM EST -----  Regarding: Dr Montez Radames  Pt's mother  Tonya Carrasco p) [phone] or pt's  (p) [phone], said the letter that was written for her by Dr Katiana Flowers, did not have enough information regarding her dx and on why she needs to be out fo work for  One month. Stating, she barley can speak and shaking of her head showing how that can effect her working. Pt 's mother said they will need a return call back when she can  the revised note for her job.

## 2018-03-02 NOTE — MR AVS SNAPSHOT
Jayy Martel 
 
 
 [address] [place] [address] 
[phone] Patient: Lorenoz MRN: [id number] QCQ:[id number] Visit Information Date & Time Provider Department Dept. Phone Encounter #  
 3/2/2018 11:00 AM Vasquez Mi MD [address] [place] [address] [phone] [id number] Follow-up Instructions Return in about 4 weeks (around 3/30/2018) for Medication Check. Your Appointments 3/23/2018  3:40 PM  
Follow Up with Adrien Gonzáles MD  
[facility]) Appt Note: 2 month follow up KRU 2/22; 1 month FU, KRU 2/22 [address] [place] [address] 1400 [facility] [address]  
[phone]  
  
   
 [address] [facility] [address] [facility] [address] Upcoming Health Maintenance Date Due Pneumococcal 19-64 Medium Risk (1 of 1 - PPSV23) 5/26/1991 Influenza Age 5 to Adult 8/1/2017 PAP AKA CERVICAL CYTOLOGY 6/15/2022 DTaP/Tdap/Td series (2 - Td) 8/3/2027 Allergies as of 3/2/2018  Review Complete On: 3/2/2018 By: Jennifer Elias Severity Noted Reaction Type Reactions Propofol  06/15/2017    Other (comments) Patient states she has a difficult time waking up Current Immunizations  Reviewed on 8/3/2017 Name Date Tdap 8/3/2017 Not reviewed this visit You Were Diagnosed With   
  
 Codes Comments Depression, unspecified depression type     ICD-10-CM: F32.9 ICD-9-CM: 216 Vitals BP Pulse Temp Resp Height(growth percentile) Weight(growth percentile) 120/78 (BP 1 Location: Left arm, BP Patient Position: Sitting) 79 98.2 °F (36.8 °C) (Oral) 18 5' 7\" (1.702 m) 190 lb 9.6 oz (86.5 kg) LMP SpO2 BMI OB Status Smoking Status 02/01/2018 (Exact Date) 98% 29.85 kg/m2 Having regular periods Current Some Day Smoker Vitals History BMI and BSA Data  Body Mass Index Body Surface Area  
 29.85 kg/m 2 2.02 m 2  
 Preferred Pharmacy Pharmacy Name Phone [facility]/PHARMACY #[id number]- [place] [address] [phone] Your Updated Medication List  
  
   
This list is accurate as of 3/2/18 11:48 AM.  Always use your most recent med list. amLODIPine 10 mg tablet Commonly known as:  Wilberto Rodriguez Take 1 Tab by mouth daily. citalopram 20 mg tablet Commonly known as:  Drucilla Eng Take 1 Tab by mouth daily. DOSE CHANGE  
  
 hydroCHLOROthiazide 25 mg tablet Commonly known as:  HYDRODIURIL  
TAKE 1 TABLET BY MOUTH EVERY DAY  
  
 lisinopril 10 mg tablet Commonly known as:  PRINIVIL, ZESTRIL  
TAKE 1 TABLET BY MOUTH EVERY DAY  
  
 pantoprazole 40 mg tablet Commonly known as:  PROTONIX  
TAKE 1 TABLET BY MOUTH EVERY DAY BEFORE BREAKFAST  
  
 TYLENOL EXTRA STRENGTH 500 mg tablet Generic drug:  acetaminophen Take 500 mg by mouth every six (6) hours as needed for Pain. VITAMIN D3 2,000 unit Tab Generic drug:  cholecalciferol (vitamin D3) Take 2,000 Units by mouth daily. Prescriptions Sent to Pharmacy Refills  
 citalopram (CELEXA) 20 mg tablet 0 Sig: Take 1 Tab by mouth daily. DOSE CHANGE Class: Normal  
 Pharmacy: [facility]/pharmacy #[id number]- [place] [address] Ph #: [phone] Route: Oral  
 amLODIPine (NORVASC) 10 mg tablet 0 Sig: Take 1 Tab by mouth daily. Class: Normal  
 Pharmacy: [facility]/pharmacy #[id number]- [place] [address] Ph #: [phone] Route: Oral  
  
Follow-up Instructions Return in about 4 weeks (around 3/30/2018) for Medication Check. Patient Instructions DASH Diet: Care Instructions Your Care Instructions The DASH diet is an eating plan that can help lower your blood pressure. DASH stands for Dietary Approaches to Stop Hypertension. Hypertension is high blood pressure. The DASH diet focuses on eating foods that are high in calcium, potassium, and magnesium. These nutrients can lower blood pressure. The foods that are highest in these nutrients are fruits, vegetables, low-fat dairy products, nuts, seeds, and legumes. But taking calcium, potassium, and magnesium supplements instead of eating foods that are high in those nutrients does not have the same effect. The DASH diet also includes whole grains, fish, and poultry. The DASH diet is one of several lifestyle changes your doctor may recommend to lower your high blood pressure. Your doctor may also want you to decrease the amount of sodium in your diet. Lowering sodium while following the DASH diet can lower blood pressure even further than just the DASH diet alone. Follow-up care is a key part of your treatment and safety. Be sure to make and go to all appointments, and call your doctor if you are having problems. It's also a good idea to know your test results and keep a list of the medicines you take. How can you care for yourself at home? Following the DASH diet · Eat 4 to 5 servings of fruit each day. A serving is 1 medium-sized piece of fruit, ½ cup chopped or canned fruit, 1/4 cup dried fruit, or 4 ounces (½ cup) of fruit juice. Choose fruit more often than fruit juice. · Eat 4 to 5 servings of vegetables each day. A serving is 1 cup of lettuce or raw leafy vegetables, ½ cup of chopped or cooked vegetables, or 4 ounces (½ cup) of vegetable juice. Choose vegetables more often than vegetable juice. · Get 2 to 3 servings of low-fat and fat-free dairy each day. A serving is 8 ounces of milk, 1 cup of yogurt, or 1 ½ ounces of cheese. · Eat 6 to 8 servings of grains each day. A serving is 1 slice of bread, 1 ounce of dry cereal, or ½ cup of cooked rice, pasta, or cooked cereal. Try to choose whole-grain products as much as possible. · Limit lean meat, poultry, and fish to 2 servings each day.  A serving is 3 ounces, about the size of a deck of cards. · Eat 4 to 5 servings of nuts, seeds, and legumes (cooked dried beans, lentils, and split peas) each week. A serving is 1/3 cup of nuts, 2 tablespoons of seeds, or ½ cup of cooked beans or peas. · Limit fats and oils to 2 to 3 servings each day. A serving is 1 teaspoon of vegetable oil or 2 tablespoons of salad dressing. · Limit sweets and added sugars to 5 servings or less a week. A serving is 1 tablespoon jelly or jam, ½ cup sorbet, or 1 cup of lemonade. · Eat less than 2,300 milligrams (mg) of sodium a day. If you limit your sodium to 1,500 mg a day, you can lower your blood pressure even more. Tips for success · Start small. Do not try to make dramatic changes to your diet all at once. You might feel that you are missing out on your favorite foods and then be more likely to not follow the plan. Make small changes, and stick with them. Once those changes become habit, add a few more changes. · Try some of the following: ¨ Make it a goal to eat a fruit or vegetable at every meal and at snacks. This will make it easy to get the recommended amount of fruits and vegetables each day. ¨ Try yogurt topped with fruit and nuts for a snack or healthy dessert. ¨ Add lettuce, tomato, cucumber, and onion to sandwiches. ¨ Combine a ready-made pizza crust with low-fat mozzarella cheese and lots of vegetable toppings. Try using tomatoes, squash, spinach, broccoli, carrots, cauliflower, and onions. ¨ Have a variety of cut-up vegetables with a low-fat dip as an appetizer instead of chips and dip. ¨ Sprinkle sunflower seeds or chopped almonds over salads. Or try adding chopped walnuts or almonds to cooked vegetables. ¨ Try some vegetarian meals using beans and peas. Add garbanzo or kidney beans to salads. Make burritos and tacos with mashed koch beans or black beans. Where can you learn more? Go to http://sprague-jessi.info/. Enter U731 in the search box to learn more about \"DASH Diet: Care Instructions. \" Current as of: September 21, 2016 Content Version: 11.4 © 6293-8186 Healthwise, [employer]. Care instructions adapted under license by [employer] (which disclaims liability or warranty for this information). If you have questions about a medical condition or this instruction, always ask your healthcare professional. [address] any warranty or liability for your use of this information. Introducing [facility]! [facility] introduces [employer] patient portal. Now you can access parts of your medical record, email your doctor's office, and request medication refills online. 1. In your internet browser, go to [email or web address] 2. Click on the First Time User? Click Here link in the Sign In box. You will see the New Member Sign Up page. 3. Enter your [employer] Access Code exactly as it appears below. You will not need to use this code after youve completed the sign-up process. If you do not sign up before the expiration date, you must request a new code. · [employer] Access Code: [email or web address] Expires: 5/23/2018 11:12 AM 
 
4. Enter the last four digits of your Social Security Number (xxxx) and Date of Birth (mm/dd/yyyy) as indicated and click Submit. You will be taken to the next sign-up page. 5. Create a [employer] ID. This will be your [employer] login ID and cannot be changed, so think of one that is secure and easy to remember. 6. Create a [employer] password. You can change your password at any time. 7. Enter your Password Reset Question and Answer. This can be used at a later time if you forget your password. 8. Enter your e-mail address. You will receive e-mail notification when new information is available in [address] 9. Click Sign Up. You can now view and download portions of your medical record. 10. Click the Download Summary menu link to download a portable copy of your medical information. If you have questions, please visit the Frequently Asked Questions section of the [facility] website. Remember, [facility] is NOT to be used for urgent needs. For medical emergencies, dial 911. Now available from your iPhone and Android! Please provide this summary of care documentation to your next provider. Your primary care clinician is listed as Rico Pelletier. If you have any questions after today's visit, please call [phone].

## 2018-03-02 NOTE — PROGRESS NOTES
Patient Name: Judd Mcknight   MRN: [id number]    [address] is a 41 y.o. female who presents with the following:     Patient here for follow-up for blood pressure check and tremor. She was recently admitted due to sudden onset of tremor, cervical dystonia, and slurred speech. Neurological workup was extensive without definitive diagnosis  She did see neurology clinic last week who recommended anxiety management, blood pressure control, and physical therapy. Today, she reports that her tremor has slightly improved although still present. Her speech is almost back to normal.  She states that her anxiety level is manageable. Noticed that she becomes very sleepy on her current dose of Celexa; would like to try a lower dose if possible. Has been monitoring her blood pressure at home which has been within normal limits; was told to stop the hydralazine if her blood pressure was normal at home therefore she has not taken it in the past week. Review of Systems   Constitutional: Negative for fever, malaise/fatigue and weight loss. Respiratory: Negative for cough, hemoptysis, shortness of breath and wheezing. Cardiovascular: Negative for chest pain, palpitations, leg swelling and PND. Gastrointestinal: Negative for abdominal pain, constipation, diarrhea, nausea and vomiting. Neurological: Positive for tremors. The patient's medications, allergies, past medical history, surgical history, family history and social history were reviewed and updated where appropriate. Prior to Admission medications    Medication Sig Start Date End Date Taking? Authorizing Provider   acetaminophen (TYLENOL EXTRA STRENGTH) 500 mg tablet Take 500 mg by mouth every six (6) hours as needed for Pain. Yes Historical Provider   citalopram (CELEXA) 20 mg tablet Take 1 Tab by mouth daily. DOSE CHANGE 3/2/18  Yes Chichi Lopez MD   amLODIPine (NORVASC) 10 mg tablet Take 1 Tab by mouth daily.  3/2/18  Yes Rasta Mas MD   pantoprazole (PROTONIX) 40 mg tablet TAKE 1 TABLET BY MOUTH EVERY DAY BEFORE BREAKFAST 2/23/18  Yes Rico Pelletier MD   lisinopril (PRINIVIL, ZESTRIL) 10 mg tablet TAKE 1 TABLET BY MOUTH EVERY DAY 2/10/18  Yes Rico Pelletier MD   hydroCHLOROthiazide (HYDRODIURIL) 25 mg tablet TAKE 1 TABLET BY MOUTH EVERY DAY 2/10/18  Yes Rasta Mas MD   cholecalciferol, vitamin D3, (VITAMIN D3) 2,000 unit tab Take 2,000 Units by mouth daily. Yes Historical Provider       Allergies   Allergen Reactions    Propofol Other (comments)     Patient states she has a difficult time waking up           OBJECTIVE    Visit Vitals    /78 (BP 1 Location: Left arm, BP Patient Position: Sitting)    Pulse 79    Temp 98.2 °F (36.8 °C) (Oral)    Resp 18    Ht 5' 7\" (1.702 m)    Wt 190 lb 9.6 oz (86.5 kg)    LMP 02/01/2018 (Exact Date)    SpO2 98%    BMI 29.85 kg/m2       Physical Exam   Constitutional: She is oriented to person, place, and time and well-developed, well-nourished, and in no distress. No distress. Eyes: Conjunctivae and EOM are normal. Pupils are equal, round, and reactive to light. Cardiovascular: Normal rate, regular rhythm and normal heart sounds. Exam reveals no gallop and no friction rub. No murmur heard. Pulmonary/Chest: Effort normal and breath sounds normal. No respiratory distress. She has no wheezes. Neurological: She is alert and oriented to person, place, and time. She displays tremor (Mild head tremor, more noticeable lateral neck rotation). Gait normal.   Skin: Skin is warm and dry. No rash noted. She is not diaphoretic. Psychiatric: Mood, memory, affect and judgment normal.   Nursing note and vitals reviewed. ASSESSMENT AND PLAN  Jessica Flores is a 41 y.o. female who presents today for:    1. Tremor  Clinically improving. Patient to follow-up with neurology. 2. Isolated cervical dystonia  As above.     3. HTN, goal below 140/90  Continue current management. - lisinopril (PRINIVIL, ZESTRIL) 10 mg tablet; TAKE 1 TABLET BY MOUTH EVERY DAY  Dispense: 90 Tab; Refill: 0  - hydroCHLOROthiazide (HYDRODIURIL) 25 mg tablet; TAKE 1 TABLET BY MOUTH EVERY DAY  Dispense: 90 Tab; Refill: 0    4. Depression, unspecified depression type  Will reduce Celexa from 40 to 20 mg. Offered patient to reduce to 30 mg which would be taking 1.5 tabs of the 20 mg tablets but patient would like to just try 20 mg only. - citalopram (CELEXA) 20 mg tablet; Take 1 Tab by mouth daily. DOSE CHANGE  Dispense: 90 Tab; Refill: 0    5. Gastroesophageal reflux disease, esophagitis presence not specified  - pantoprazole (PROTONIX) 40 mg tablet; TAKE 1 TABLET BY MOUTH EVERY DAY BEFORE BREAKFAST  Dispense: 90 Tab; Refill: 0    6. Encounter for immunization  - Influenza virus vaccine (QUADRIVALENT PRES FREE SYRINGE) IM ([id number])  - MS IMMUNIZ ADMIN,1 SINGLE/COMB VAC/TOXOID       Medications Discontinued During This Encounter   Medication Reason    atorvastatin (LIPITOR) 40 mg tablet Not A Current Medication    hydrALAZINE (APRESOLINE) 25 mg tablet Not A Current Medication    citalopram (CELEXA) 40 mg tablet Reorder    amLODIPine (NORVASC) 5 mg tablet Reorder    clonazePAM (KLONOPIN) 0.5 mg tablet Not A Current Medication    pregabalin (LYRICA) 25 mg capsule Not A Current Medication    pantoprazole (PROTONIX) 40 mg tablet Reorder    lisinopril (PRINIVIL, ZESTRIL) 10 mg tablet Reorder    hydroCHLOROthiazide (HYDRODIURIL) 25 mg tablet Reorder       Follow-up Disposition:  Return in about 4 weeks (around 3/30/2018) for Medication Check. Time: 25 minutes was spent with this patient face to face discussing test results, follow up visits, and when repeat testing. I discussed diagnoses, risk factors and treatment for each based on current recommendations and literature. Greater than 50% of total visit time was spent in counseling and coordination of care.     Medication risks/benefits/costs/interactions/alternatives discussed with patient. Advised patient to call back or return to office if symptoms worsen/change/persist. If patient cannot reach us or should anything more severe/urgent arise he/she should proceed directly to the nearest emergency department. Discussed expected course/resolution/complications of diagnosis in detail with patient. Patient given a written after visit summary which includes his/her diagnoses, current medications and vitals. Patient expressed understanding with the diagnosis and plan.      Lowell Moon M.D.

## 2018-03-02 NOTE — TELEPHONE ENCOUNTER
Generally, we avoid putting diagnoses and symptoms in the letter for privacy reasons, unless the patient wants that. Please check and see if the patient is agreeable to that.  If yes, a new letter can be provided to the patient

## 2018-03-02 NOTE — LETTER
[address]  [address] [place] [address] 
[phone] Work Note Date: 3/2/2018 To Ms Mireya Moya One) Laura Brandt is being seen and evaluated in our office. She is improving and has requested a note from us to start working part-time. Based on her medical condition(s), I think this is reasonable starting 3/27/18 Please feel free to contact us if there are any questions Sincerely, Tamika Rosa MD

## 2018-03-02 NOTE — PATIENT INSTRUCTIONS

## 2018-03-13 ENCOUNTER — TELEPHONE (OUTPATIENT)
Dept: FAMILY MEDICINE CLINIC | Age: 48
End: 2018-03-13

## 2018-03-13 LAB
HBA1C MFR BLD HPLC: 6.1 %
LIPOPROTEIN (A), 120189: 45
Lab: 113 MG/DL
Lab: 168 MG/DL
TRIGL SERPL-MCNC: 49 MG/DL
TSH SERPL DL<=0.005 MIU/L-ACNC: 3.36 UIU/ML

## 2018-03-13 NOTE — TELEPHONE ENCOUNTER
Call to patient.  verified. Informed we sent of pantoprazole 40mg 3/2/18 to pharmacy requested. Patient read back all medication she picked up from pharmacy yesterday all but pantoprazole was there. Informed her to call the pharmacy and inform them to give her pantoprazole and it was refilled the same time all the others were.  Patient understood

## 2018-03-13 NOTE — TELEPHONE ENCOUNTER
----- Message from Carroll Lacrosse sent at 3/12/2018  5:45 PM EDT -----  Regarding: Dr. Jose Carlos Barnes  Pt was given a new medication at her last visit, but her refill of Pantoprazole sodium DR 40 mg is missing. She would like to know if this was accidental or is she supposed to stop taking this. Best contact number [phone].

## 2018-03-23 ENCOUNTER — OFFICE VISIT (OUTPATIENT)
Dept: NEUROLOGY | Age: 48
End: 2018-03-23

## 2018-03-23 VITALS
WEIGHT: 191 LBS | SYSTOLIC BLOOD PRESSURE: 118 MMHG | BODY MASS INDEX: 29.91 KG/M2 | HEART RATE: 70 BPM | OXYGEN SATURATION: 96 % | RESPIRATION RATE: 18 BRPM | DIASTOLIC BLOOD PRESSURE: 80 MMHG

## 2018-03-23 DIAGNOSIS — R25.1 TREMOR: ICD-10-CM

## 2018-03-23 DIAGNOSIS — R47.9 SPEECH DISTURBANCE, UNSPECIFIED TYPE: ICD-10-CM

## 2018-03-23 DIAGNOSIS — R26.9 GAIT DISTURBANCE: ICD-10-CM

## 2018-03-23 DIAGNOSIS — R25.9 ABNORMAL MOVEMENTS: Primary | ICD-10-CM

## 2018-03-23 NOTE — PATIENT INSTRUCTIONS

## 2018-03-23 NOTE — MR AVS SNAPSHOT
[facility] [address] [place], [place] [phone] Patient: Lorenzo MRN: [id number] Visit Information Date & Time Provider Department Dept. Phone Encounter #  
 3/23/2018  3:40 PM Arcelia Trimble MD [facility] at [address] [place] [address] [id number] Follow-up Instructions Return in about 4 weeks (around 4/20/2018), or if symptoms worsen or fail to improve. Your Appointments 4/3/2018  3:15 PM  
ROUTINE CARE with Walter Rodriguez MD  
[facility]) Appt Note: 1 month follow up BP  
 [address] [id number]  
[phone]  
  
   
 [address] [address] Upcoming Health Maintenance Date Due Pneumococcal 19-64 Medium Risk (1 of 1 - PPSV23) 5/26/1991 PAP AKA CERVICAL CYTOLOGY 6/15/2022 DTaP/Tdap/Td series (2 - Td) 8/3/2027 Allergies as of 3/23/2018  Review Complete On: 3/23/2018 By: Will Carter LPN Severity Noted Reaction Type Reactions Propofol  06/15/2017    Other (comments) Patient states she has a difficult time waking up Current Immunizations  Reviewed on 8/3/2017 Name Date Influenza Vaccine (Quad) PF 3/2/2018 Tdap 8/3/2017 Not reviewed this visit You Were Diagnosed With   
  
 Codes Comments Abnormal movements    -  Primary ICD-10-CM: R25.9 ICD-9-CM: 781.0 Speech disturbance, unspecified type     ICD-10-CM: R47.9 ICD-9-CM: 784.59 Tremor     ICD-10-CM: R25.1 ICD-9-CM: 781.0 Gait disturbance     ICD-10-CM: R26.9 ICD-9-CM: 694. 2 Vitals BP Pulse Resp Weight(growth percentile) SpO2 BMI  
 118/80 70 18 191 lb (86.6 kg) 96% 29.91 kg/m2 OB Status Smoking Status Having regular periods Current Some Day Smoker Vitals History BMI and BSA Data Body Mass Index Body Surface Area 29.91 kg/m 2 2.02 m 2 Preferred Pharmacy Pharmacy Name Phone [id number]- [place], [address] [facility] [phone] Your Updated Medication List  
  
   
This list is accurate as of 3/23/18  4:01 PM.  Always use your most recent med list. amLODIPine 10 mg tablet Commonly known as:  Deloris Munoz Take 1 Tab by mouth daily. citalopram 20 mg tablet Commonly known as:  Pardeep Parson Take 1 Tab by mouth daily. DOSE CHANGE  
  
 hydroCHLOROthiazide 25 mg tablet Commonly known as:  HYDRODIURIL  
TAKE 1 TABLET BY MOUTH EVERY DAY  
  
 lisinopril 10 mg tablet Commonly known as:  PRINIVIL, ZESTRIL  
TAKE 1 TABLET BY MOUTH EVERY DAY  
  
 pantoprazole 40 mg tablet Commonly known as:  PROTONIX  
TAKE 1 TABLET BY MOUTH EVERY DAY BEFORE BREAKFAST  
  
 TYLENOL EXTRA STRENGTH 500 mg tablet Generic drug:  acetaminophen Take 500 mg by mouth every six (6) hours as needed for Pain. VITAMIN D3 2,000 unit Tab Generic drug:  cholecalciferol (vitamin D3) Take 2,000 Units by mouth daily. Follow-up Instructions Return in about 4 weeks (around 4/20/2018), or if symptoms worsen or fail to improve. To-Do List   
 03/23/2018 Neurology:  EEG Patient Instructions A Healthy Lifestyle: Care Instructions Your Care Instructions A healthy lifestyle can help you feel good, stay at a healthy weight, and have plenty of energy for both work and play. A healthy lifestyle is something you can share with your whole family. A healthy lifestyle also can lower your risk for serious health problems, such as high blood pressure, heart disease, and diabetes. You can follow a few steps listed below to improve your health and the health of your family. Follow-up care is a key part of your treatment and safety.  Be sure to make and go to all appointments, and call your doctor if you are having problems. It's also a good idea to know your test results and keep a list of the medicines you take. How can you care for yourself at home? · Do not eat too much sugar, fat, or fast foods. You can still have dessert and treats now and then. The goal is moderation. · Start small to improve your eating habits. Pay attention to portion sizes, drink less juice and soda pop, and eat more fruits and vegetables. ¨ Eat a healthy amount of food. A 3-ounce serving of meat, for example, is about the size of a deck of cards. Fill the rest of your plate with vegetables and whole grains. ¨ Limit the amount of soda and sports drinks you have every day. Drink more water when you are thirsty. ¨ Eat at least 5 servings of fruits and vegetables every day. It may seem like a lot, but it is not hard to reach this goal. A serving or helping is 1 piece of fruit, 1 cup of vegetables, or 2 cups of leafy, raw vegetables. Have an apple or some carrot sticks as an afternoon snack instead of a candy bar. Try to have fruits and/or vegetables at every meal. 
· Make exercise part of your daily routine. You may want to start with simple activities, such as walking, bicycling, or slow swimming. Try to be active 30 to 60 minutes every day. You do not need to do all 30 to 60 minutes all at once. For example, you can exercise 3 times a day for 10 or 20 minutes. Moderate exercise is safe for most people, but it is always a good idea to talk to your doctor before starting an exercise program. 
· Keep moving. Maura Hoop the lawn, work in the garden, or [employer]. Take the stairs instead of the elevator at work. · If you smoke, quit. People who smoke have an increased risk for heart attack, stroke, cancer, and other lung illnesses. Quitting is hard, but there are ways to boost your chance of quitting tobacco for good. ¨ Use nicotine gum, patches, or lozenges. ¨ Ask your doctor about stop-smoking programs and medicines. ¨ Keep trying. In addition to reducing your risk of diseases in the future, you will notice some benefits soon after you stop using tobacco. If you have shortness of breath or asthma symptoms, they will likely get better within a few weeks after you quit. · Limit how much alcohol you drink. Moderate amounts of alcohol (up to 2 drinks a day for men, 1 drink a day for women) are okay. But drinking too much can lead to liver problems, high blood pressure, and other health problems. Family health If you have a family, there are many things you can do together to improve your health. · Eat meals together as a family as often as possible. · Eat healthy foods. This includes fruits, vegetables, lean meats and dairy, and whole grains. · Include your family in your fitness plan. Most people think of activities such as jogging or tennis as the way to fitness, but there are many ways you and your family can be more active. Anything that makes you breathe hard and gets your heart pumping is exercise. Here are some tips: 
¨ Walk to do errands or to take your child to school or the bus. ¨ Go for a family bike ride after dinner instead of watching TV. Where can you learn more? Go to http://[employer].jessi.info/. Enter W679 in the search box to learn more about \"A Healthy Lifestyle: Care Instructions. \" Current as of: May 12, 2017 Content Version: 11.4 © 0662-7788 Healthwise, Incorporated. Care instructions adapted under license by [employer] (which disclaims liability or warranty for this information). If you have questions about a medical condition or this instruction, always ask your healthcare professional. [place] [address] any warranty or liability for your use of this information. Introducing \[facility] [place] [facility]\"" & HEALTH SERVICES! Deonna Chi introduces [employer] patient portal. Now you can access parts of your medical record, email your doctor's office, and request medication refills online. 1. In your internet browser, go to [email or web address] 2. Click on the First Time User? Click Here link in the Sign In box. You will see the New Member Sign Up page. 3. Enter your CrowdTwist Access Code exactly as it appears below. You will not need to use this code after youve completed the sign-up process. If you do not sign up before the expiration date, you must request a new code. · [employer] Access Code: [email or web address] Expires: 5/23/2018 12:12 PM 
 
4. Enter the last four digits of your Social Security Number (xxxx) and Date of Birth (mm/dd/yyyy) as indicated and click Submit. You will be taken to the next sign-up page. 5. Create a [employer] SHV[employer] ID. This will be your CrowdTwist login ID and cannot be changed, so think of one that is secure and easy to remember. 6. Create a [employer] password. You can change your password at any time. 7. Enter your Password Reset Question and Answer. This can be used at a later time if you forget your password. 8. Enter your e-mail address. You will receive e-mail notification when new information is available in [address] 9. Click Sign Up. You can now view and download portions of your medical record. 10. Click the Download Summary menu link to download a portable copy of your medical information. If you have questions, please visit the Frequently Asked Questions section of the [employer] website. Remember, [employer] is NOT to be used for urgent needs. For medical emergencies, dial 911. Now available from your iPhone and Android! Please provide this summary of care documentation to your next provider. Your primary care clinician is listed as Rico Pelletier. If you have any questions after today's visit, please call [phone].

## 2018-03-25 NOTE — PROGRESS NOTES
Neurology Progress Note      Chief Complaint: Tremor, Speech difficulty, Gait changes and abnormal involuntary movements     Subjective:    Jessica Worthy is a 41 y.o. female who returns for follow-up. Her tremors, speech difficulty and gait changes are all much improved. Headaches have resolved. She is on antihypertensive medications. She is getting Physical and Speech Therapy at home. I do not have the notes from those visits but she states that those have gone well    She reports a new symptom of abnormal movements. These started about 4-5 days ago. She states that her left upper extremity became flexed at the elbow. Her head started shaking and then the right side of the body also felt tensed. She retained consciousness throughout the episode and has full recollection. Episode lasted about 8-10 minutes and resolved on its own. No bladder/bowel incontinence or tongue bite. She has had 2-3 more similar episodes since then but has not recognized any precipitating factors    Past Medical History:   Diagnosis Date    Depression     Headache     HTN, goal below 140/90     Hypercholesterolemia       Past Surgical History:   Procedure Laterality Date    HX  SECTION  1992, , 2009    HX COLONOSCOPY  2002    approx date    HX LAPAROTOMY        Social History     Social History    Marital status:      Spouse name: N/A    Number of children: N/A    Years of education: N/A     Occupational History    Not on file.      Social History Main Topics    Smoking status: Current Some Day Smoker    Smokeless tobacco: Never Used      Comment: cigar    Alcohol use Yes      Comment: occasional    Drug use: No    Sexual activity: Yes     Other Topics Concern    Not on file     Social History Narrative     Family History   Problem Relation Age of Onset    Drug Abuse Mother      cocaine    Depression Mother     Hypertension Mother     Drug Abuse Father      heroin    Cancer Maternal Grandmother      esophageal cancer    Colon Cancer Maternal Grandfather      in his 62s    Prostate Cancer Maternal Grandfather     Cancer Paternal Grandmother      brain, not sure of primary    Prostate Cancer Paternal Grandfather     Lupus Maternal Aunt      2 aunts with lupus          Objective:   ROS:  ROS from last visit reviewed and any changes/updates are per HPI      Allergies   Allergen Reactions    Propofol Other (comments)     Patient states she has a difficult time waking up       Meds:  Outpatient Medications Prior to Visit   Medication Sig Dispense Refill    acetaminophen (TYLENOL EXTRA STRENGTH) 500 mg tablet Take 500 mg by mouth every six (6) hours as needed for Pain.  citalopram (CELEXA) 20 mg tablet Take 1 Tab by mouth daily. DOSE CHANGE 90 Tab 0    amLODIPine (NORVASC) 10 mg tablet Take 1 Tab by mouth daily. 90 Tab 0    pantoprazole (PROTONIX) 40 mg tablet TAKE 1 TABLET BY MOUTH EVERY DAY BEFORE BREAKFAST 90 Tab 0    lisinopril (PRINIVIL, ZESTRIL) 10 mg tablet TAKE 1 TABLET BY MOUTH EVERY DAY 90 Tab 0    hydroCHLOROthiazide (HYDRODIURIL) 25 mg tablet TAKE 1 TABLET BY MOUTH EVERY DAY 90 Tab 0    cholecalciferol, vitamin D3, (VITAMIN D3) 2,000 unit tab Take 2,000 Units by mouth daily. No facility-administered medications prior to visit. Imaging:  IMAGING  MRI Results (most recent):  No results found for this or any previous visit. CT Results (most recent):  No results found for this or any previous visit.       Lab Review   Results for orders placed or performed in visit on 03/13/18   TSH   Result Value Ref Range    TSH 3.360 UIU/ML   HEMOGLOBIN A1C   Result Value Ref Range    Hemoglobin A1c, External 6.1    CHOLESTEROL, TOTAL   Result Value Ref Range    Cholesterol, Total 168 MG/DL   TRIGLYCERIDE   Result Value Ref Range    Triglyceride 49    LIPOPROTEIN (A)   Result Value Ref Range    Lipoprotein (a) 45    LDL CHOLESTEROL (CALCULATED) (SHIEL ONLY)   Result Value Ref Range    LDL CHOL 113 MG/DL        Exam:  Visit Vitals    /80    Pulse 70    Resp 18    Wt 86.6 kg (191 lb)    SpO2 96%    BMI 29.91 kg/m2     General:  Alert, cooperative, no distress. Head:  Normocephalic, without obvious abnormality, atraumatic. Respiratory:  Heart:   Non labored breathing  Regular rate and rhythm                   Neurologic:  MS: Alert and oriented x 3  Language intact, able to name, repeat, and follow all commands. Attention: normal  Fund of knowledge: Normal  Recent and remote memory: Intact. [facility] Speech: Much improved. Much less hesitancy and stuttering  Cranial Nerves:  II: visual fields Full to confrontation   II: pupils Equal, round, reactive to light       III,VII: ptosis none   III,IV,VI: extraocular muscles  EOMI, no nystagmus or diplopia   V: facial light touch sensation  normal   VII: facial muscle function   symmetric   VIII: hearing intact   IX: soft palate elevation  normal   XI: trapezius strength  5/5   XI: sternocleidomastoid strength 5/5   XII: tongue  Midline     Motor: normal bulk and tone, She has some neck tremor off and on which is variable in amplitude and direction and improves with employing distraction maneuvers. Overall improved from the last visit              Strength: 5/5 throughout  Sensory: intact to pain, touch, temperature  Gait: normal gait. visit  Reflexes: 2+ symmetric           Assessment/Plan       ICD-10-CM ICD-9-CM    1. Abnormal movements R25.9 781.0 EEG   2. Speech disturbance, unspecified type R47.9 784.59    3. Tremor R25.1 781.0    4. Gait disturbance R26.9 781.2      Overall previous symptoms are much improved.  They do not seem to fit any particular pattern seen with neurologic conditions and possibly could be functional. I have requested her to get me PT and Speech Therapy records, so that I can review there assessment to help understand the situation further    The new abnormal movements episode is quite atypical for a seizure with retained consciousness, full recollection and also lack of incontinence, tongue bite. Still, will get EEG to explore further    I spent 20/30 minutes counseling/coordinating care for abnormal movements, speech disturbance, tremor, gait disturbance    This note was created using voice recognition software. Despite editing, there may be syntax and/or spelling errors. Signed:   Jordan Macias MD  3/25/2018

## 2018-03-27 NOTE — TELEPHONE ENCOUNTER
Talk to the patient, she states that she is doing even better than when she had seen me last time. She says that she has not been released from physical therapy. She has worked out with her employer's to go back to work part-time and is requesting a letter from our office to this effect.     I have penned and saved the letter to [facility] and am forwarding to our nursing staff to fax it to the person as requested by the patient:  Fax [phone]  Attn: Deysi medel/ Mary Bai

## 2018-03-27 NOTE — TELEPHONE ENCOUNTER
Pt called would like paper sent to Fax: [phone]   Attn: Miller medel/ Mary Bai. Pt would like a call to clarify what letter says before it is sent.

## 2018-04-09 ENCOUNTER — HOSPITAL ENCOUNTER (OUTPATIENT)
Dept: NEUROLOGY | Age: 48
Discharge: HOME | End: 2018-04-09
Attending: PSYCHIATRY & NEUROLOGY
Payer: COMMERCIAL

## 2018-04-09 DIAGNOSIS — R25.9 ABNORMAL MOVEMENTS: ICD-10-CM

## 2018-04-09 PROCEDURE — 95816 EEG AWAKE AND DROWSY: CPT

## 2018-04-09 NOTE — PROCEDURES
[address]   EEG    Nicola Del Cid  MR#: [id number]  : 1970  ACCOUNT #: [de-identified]   DATE OF SERVICE: 2018    REQUESTING PHYSICIAN:  Khris Lopez MD.    HISTORY OF PRESENT ILLNESS:  The patient is a 44-year-old female who is being evaluated for abnormal involuntary movements/tremors. DESCRIPTION:  This is an 18-channel EEG performed on an awake patient. The dominant posterior background rhythm consists of symmetric very well-modulated medium voltage rhythms in the 9-10 Hz frequency range out of the posterior head region, reactive to eye opening and closure. Drowsiness and sleep states were not recorded. Photic stimulation elicits a symmetric driving response. Hyperventilation did not produce any abnormalities. During this recording, the patient had several episodes where she had entire body shaking or head shaking episodes. This produced excessive muscle and movement artifact on EEG with abrupt return of alpha rhythms when the EEG becomes readable/movement stops. ELECTROENCEPHALOGRAM SUMMARY:  Normal study. CLINICAL INTERPRETATION:  This was a normal EEG during wakeful state of the patient. No lateralizing or epileptiform features were noted. The abnormal involuntary shaking episodes captured during this EEG did not have any abnormal EEG correlates, i.e., were not seizures. Please correlate with clinical history.       MD ABILIO Edwards / [place]  D: 2018 16:30     T: 2018 16:55  JOB #: [id number]

## 2018-04-10 NOTE — PROGRESS NOTES
Please let the patient know that EEG did not show any seizures when she was having shaking movements during eeg and therefore these do not seem to be coming from epilepsy/seizures

## 2018-04-11 ENCOUNTER — TELEPHONE (OUTPATIENT)
Dept: NEUROLOGY | Age: 48
End: 2018-04-11

## 2018-04-11 NOTE — TELEPHONE ENCOUNTER
----- Message from Edgar Del Rosario MD sent at 4/10/2018  1:16 PM EDT -----  Please let the patient know that EEG did not show any seizures when she was having shaking movements during eeg and therefore these do not seem to be coming from epilepsy/seizures

## 2018-04-12 NOTE — TELEPHONE ENCOUNTER
Spoke with patient about talking about next steps at next appointment. She verbalized understanding.

## 2018-04-23 ENCOUNTER — OFFICE VISIT (OUTPATIENT)
Dept: NEUROLOGY | Age: 48
End: 2018-04-23

## 2018-04-23 VITALS
WEIGHT: 194 LBS | SYSTOLIC BLOOD PRESSURE: 124 MMHG | OXYGEN SATURATION: 98 % | RESPIRATION RATE: 18 BRPM | DIASTOLIC BLOOD PRESSURE: 86 MMHG | BODY MASS INDEX: 30.38 KG/M2 | HEART RATE: 66 BPM

## 2018-04-23 DIAGNOSIS — R56.9 CONVULSIONS, UNSPECIFIED CONVULSION TYPE (HCC): Primary | ICD-10-CM

## 2018-04-23 NOTE — LETTER
[address]  [address] [facility] [address] 
[address] 
[phone] Work/School Note Date: 4/23/2018 To Whom It May concern: 
 
Judith Boxer has been seen in our neurology clinic. We have now referred her to [facility] Neurology Department for further workup and treatment. She may stay off work pending further evaluation at [facility]. Any further recommendations about return to work would be per [employer] Sincerely, Alpa Castillo MD

## 2018-04-23 NOTE — PATIENT INSTRUCTIONS

## 2018-04-23 NOTE — MR AVS SNAPSHOT
[facility] [address] [place] [address] 
[phone] Patient: Lorenzo MRN: [id number] CWT:[id number]/65/0096 Visit Information Date & Time Provider Department Dept. Phone Encounter #  
 4/23/2018  3:40 PM MD [facility] at [address] [id number] Follow-up Instructions Return if symptoms worsen or fail to improve. Your Appointments 4/30/2018  1:30 PM  
ROUTINE CARE with Rafael Medrano MD  
[facility]) Appt Note: 1 month follow up BP; 1 month follow up BP  
 [address] [address]  
[phone]  
  
   
 [address] [address] Upcoming Health Maintenance Date Due Pneumococcal 19-64 Medium Risk (1 of 1 - PPSV23) 5/26/1991 PAP AKA CERVICAL CYTOLOGY 6/15/2022 DTaP/Tdap/Td series (2 - Td) 8/3/2027 Allergies as of 4/23/2018  Review Complete On: 4/23/2018 By: Khris Lopez MD  
  
 Severity Noted Reaction Type Reactions Propofol  06/15/2017    Other (comments) Patient states she has a difficult time waking up Current Immunizations  Reviewed on 8/3/2017 Name Date Influenza Vaccine (Quad) PF 3/2/2018 Tdap 8/3/2017 Not reviewed this visit You Were Diagnosed With   
  
 Codes Comments Convulsions, unspecified convulsion type ([facility]    -  Primary ICD-10-CM: R56.9 ICD-9-CM: 780.39 Vitals BP Pulse Resp Weight(growth percentile) SpO2 BMI  
 124/86 66 18 194 lb (88 kg) 98% 30.38 kg/m2 OB Status Smoking Status Having regular periods Current Some Day Smoker Vitals History BMI and BSA Data Body Mass Index Body Surface Area  
 30.38 kg/m 2 2.04 m 2 Preferred Pharmacy Pharmacy Name Phone [id number], [address] [facility] [phone] Your Updated Medication List  
  
   
This list is accurate as of 4/23/18  3:43 PM.  Always use your most recent med list. amLODIPine 10 mg tablet Commonly known as:  Achilles Comerio Take 1 Tab by mouth daily. citalopram 20 mg tablet Commonly known as:  Trygve Nicole Take 1 Tab by mouth daily. DOSE CHANGE  
  
 hydroCHLOROthiazide 25 mg tablet Commonly known as:  HYDRODIURIL  
TAKE 1 TABLET BY MOUTH EVERY DAY  
  
 lisinopril 10 mg tablet Commonly known as:  PRINIVIL, ZESTRIL  
TAKE 1 TABLET BY MOUTH EVERY DAY  
  
 pantoprazole 40 mg tablet Commonly known as:  PROTONIX  
TAKE 1 TABLET BY MOUTH EVERY DAY BEFORE BREAKFAST  
  
 TYLENOL EXTRA STRENGTH 500 mg tablet Generic drug:  acetaminophen Take 500 mg by mouth every six (6) hours as needed for Pain. VITAMIN D3 2,000 unit Tab Generic drug:  cholecalciferol (vitamin D3) Take 2,000 Units by mouth daily. We Performed the Following REFERRAL TO NEUROLOGY [KGE98 Custom] Comments:  
 Referred to [facility] Neurology [address] Jigar Maldonado Follow-up Instructions Return if symptoms worsen or fail to improve. Referral Information Referral ID Referred By Referred To  
  
 [id number] April Fuentes Not Available Visits Status Start Date End Date 1 Authorized 4/23/18 4/23/19 If your referral has a status of pending review or denied, additional information will be sent to support the outcome of this decision. Patient Instructions A Healthy Lifestyle: Care Instructions Your Care Instructions A healthy lifestyle can help you feel good, stay at a healthy weight, and have plenty of energy for both work and play. A healthy lifestyle is something you can share with your whole family. A healthy lifestyle also can lower your risk for serious health problems, such as high blood pressure, heart disease, and diabetes.  
You can follow a few steps listed below to improve your health and the health of your family. Follow-up care is a key part of your treatment and safety. Be sure to make and go to all appointments, and call your doctor if you are having problems. It's also a good idea to know your test results and keep a list of the medicines you take. How can you care for yourself at home? · Do not eat too much sugar, fat, or fast foods. You can still have dessert and treats now and then. The goal is moderation. · Start small to improve your eating habits. Pay attention to portion sizes, drink less juice and soda pop, and eat more fruits and vegetables. ¨ Eat a healthy amount of food. A 3-ounce serving of meat, for example, is about the size of a deck of cards. Fill the rest of your plate with vegetables and whole grains. ¨ Limit the amount of soda and sports drinks you have every day. Drink more water when you are thirsty. ¨ Eat at least 5 servings of fruits and vegetables every day. It may seem like a lot, but it is not hard to reach this goal. A serving or helping is 1 piece of fruit, 1 cup of vegetables, or 2 cups of leafy, raw vegetables. Have an apple or some carrot sticks as an afternoon snack instead of a candy bar. Try to have fruits and/or vegetables at every meal. 
· Make exercise part of your daily routine. You may want to start with simple activities, such as walking, bicycling, or slow swimming. Try to be active 30 to 60 minutes every day. You do not need to do all 30 to 60 minutes all at once. For example, you can exercise 3 times a day for 10 or 20 minutes. Moderate exercise is safe for most people, but it is always a good idea to talk to your doctor before starting an exercise program. 
· Keep moving. Jairon Curls the lawn, work in the garden, or iMICROQ. Take the stairs instead of the elevator at work. · If you smoke, quit. People who smoke have an increased risk for heart attack, stroke, cancer, and other lung illnesses.  Quitting is hard, but there are ways to boost your chance of quitting tobacco for good. ¨ Use nicotine gum, patches, or lozenges. ¨ Ask your doctor about stop-smoking programs and medicines. ¨ Keep trying. In addition to reducing your risk of diseases in the future, you will notice some benefits soon after you stop using tobacco. If you have shortness of breath or asthma symptoms, they will likely get better within a few weeks after you quit. · Limit how much alcohol you drink. Moderate amounts of alcohol (up to 2 drinks a day for men, 1 drink a day for women) are okay. But drinking too much can lead to liver problems, high blood pressure, and other health problems. Family health If you have a family, there are many things you can do together to improve your health. · Eat meals together as a family as often as possible. · Eat healthy foods. This includes fruits, vegetables, lean meats and dairy, and whole grains. · Include your family in your fitness plan. Most people think of activities such as jogging or tennis as the way to fitness, but there are many ways you and your family can be more active. Anything that makes you breathe hard and gets your heart pumping is exercise. Here are some tips: 
¨ Walk to do errands or to take your child to school or the bus. ¨ Go for a family bike ride after dinner instead of watching TV. Where can you learn more? Go to http://darcie-jessi.info/. Enter F608 in the search box to learn more about \"A Healthy Lifestyle: Care Instructions. \" Current as of: May 12, 2017 Content Version: 11.4 © 1207-5449 [employer] | [employer]. Care instructions adapted under license by [employer] (which disclaims liability or warranty for this information). If you have questions about a medical condition or this instruction, always ask your healthcare professional. [address] any warranty or liability for your use of this information. Introducing [facility]! [facility] introduces [employer] patient portal. Now you can access parts of your medical record, email your doctor's office, and request medication refills online. 1. In your internet browser, go to [email or web address] 2. Click on the First Time User? Click Here link in the Sign In box. You will see the New Member Sign Up page. 3. Enter your [employer] Access Code exactly as it appears below. You will not need to use this code after youve completed the sign-up process. If you do not sign up before the expiration date, you must request a new code. · [employer] Access Code: [email or web address] Expires: 5/23/2018 12:12 PM 
 
4. Enter the last four digits of your Social Security Number (xxxx) and Date of Birth (mm/dd/yyyy) as indicated and click Submit. You will be taken to the next sign-up page. 5. Create a Lav[employer] Skate ID. This will be your [employer] login ID and cannot be changed, so think of one that is secure and easy to remember. 6. Create a [employer] password. You can change your password at any time. 7. Enter your Password Reset Question and Answer. This can be used at a later time if you forget your password. 8. Enter your e-mail address. You will receive e-mail notification when new information is available in [address] 9. Click Sign Up. You can now view and download portions of your medical record. 10. Click the Download Summary menu link to download a portable copy of your medical information. If you have questions, please visit the Frequently Asked Questions section of the [employer] website. Remember, [employer] is NOT to be used for urgent needs. For medical emergencies, dial 911. Now available from your iPhone and Android! Please provide this summary of care documentation to your next provider. Your primary care clinician is listed as Rico Pelletier. If you have any questions after today's visit, please call [phone].

## 2018-04-24 NOTE — PROGRESS NOTES
Chief Complaint:   Chief Complaint   Patient presents with    Seizure      Subjective:    Judith Boxer is a 41 y.o. female who returns for follow-up. Her speech difficulty, tremors, gait changes all continue to improve. She has been getting physical and speech therapy at home and I still do not have any notes from them. Patient states that she did ask them to fax their notes to us and we will try to do so again. She had an EEG which did capture her typical abnormal involuntary movements episode and there was no epileptiform correlate. She says that since then she has been having new type of abnormal involuntary movement episodes which involve whole body shaking while patient is lying down and can last for minutes together. Patient's family member have taken a video in the patient did show that to me. She was having quite vigorous movements of all 4 extremities as well as her pelvis neck and head her eyes were closed. These are not accompanied by incontinence. Patient believes that there may be some confusion afterwards. Past Medical History:   Diagnosis Date    Depression     Headache     HTN, goal below 140/90     Hypercholesterolemia       Past Surgical History:   Procedure Laterality Date    HX  SECTION  1992, ,     HX COLONOSCOPY      approx date    HX LAPAROTOMY        Social History     Social History    Marital status:      Spouse name: N/A    Number of children: N/A    Years of education: N/A     Occupational History    Not on file.      Social History Main Topics    Smoking status: Current Some Day Smoker    Smokeless tobacco: Never Used      Comment: cigar    Alcohol use Yes      Comment: occasional    Drug use: No    Sexual activity: Yes     Other Topics Concern    Not on file     Social History Narrative     Family History   Problem Relation Age of Onset    Drug Abuse Mother      cocaine    Depression Mother     Hypertension Mother  Drug Abuse Father      heroin    Cancer Maternal Grandmother      esophageal cancer    Colon Cancer Maternal Grandfather      in his 62s    Prostate Cancer Maternal Grandfather     Cancer Paternal Grandmother      brain, not sure of primary    Prostate Cancer Paternal Grandfather     Lupus Maternal Aunt      2 aunts with lupus          Objective:   ROS:  ROS from last visit reviewed and any changes/updates are per HPI      Allergies   Allergen Reactions    Propofol Other (comments)     Patient states she has a difficult time waking up       Meds:  Outpatient Medications Prior to Visit   Medication Sig Dispense Refill    acetaminophen (TYLENOL EXTRA STRENGTH) 500 mg tablet Take 500 mg by mouth every six (6) hours as needed for Pain.  citalopram (CELEXA) 20 mg tablet Take 1 Tab by mouth daily. DOSE CHANGE 90 Tab 0    amLODIPine (NORVASC) 10 mg tablet Take 1 Tab by mouth daily. 90 Tab 0    pantoprazole (PROTONIX) 40 mg tablet TAKE 1 TABLET BY MOUTH EVERY DAY BEFORE BREAKFAST 90 Tab 0    lisinopril (PRINIVIL, ZESTRIL) 10 mg tablet TAKE 1 TABLET BY MOUTH EVERY DAY 90 Tab 0    hydroCHLOROthiazide (HYDRODIURIL) 25 mg tablet TAKE 1 TABLET BY MOUTH EVERY DAY 90 Tab 0    cholecalciferol, vitamin D3, (VITAMIN D3) 2,000 unit tab Take 2,000 Units by mouth daily. No facility-administered medications prior to visit. EEG:  This was a normal EEG during wakeful state of the patient. No lateralizing or epileptiform features were noted. The abnormal involuntary shaking episodes captured during this EEG did not have any abnormal EEG correlates, i.e., were not seizures. Please correlate with clinical history.     Imaging:  IMAGING  MRI Results (most recent):    Results from Abstract encounter on 03/26/18   MRI BRAIN WO CONT     CT Results (most recent):    Results from Abstract encounter on 03/26/18   CT HEAD FACIAL WO CONT      Lab Review   Results for orders placed or performed in visit on 03/13/18   TSH Result Value Ref Range    TSH 3.360 UIU/ML   HEMOGLOBIN A1C   Result Value Ref Range    Hemoglobin A1c, External 6.1    CHOLESTEROL, TOTAL   Result Value Ref Range    Cholesterol, Total 168 MG/DL   TRIGLYCERIDE   Result Value Ref Range    Triglyceride 49    LIPOPROTEIN (A)   Result Value Ref Range    Lipoprotein (a) 45    LDL CHOLESTEROL (CALCULATED) (SHIEL ONLY)   Result Value Ref Range    LDL CHOL 113 MG/DL        Exam:  Visit Vitals    /86    Pulse 66    Resp 18    Wt 88 kg (194 lb)    SpO2 98%    BMI 30.38 kg/m2     General:  Alert, cooperative, no distress. Head:  Normocephalic, without obvious abnormality, atraumatic. Respiratory:  Heart:   Non labored breathing  Regular rate and rhythm                   Neurologic:  MS: Alert and oriented x 3  Language intact, able to name, repeat, and follow all commands. Attention: normal  Fund of knowledge: Normal  Recent and remote memory: Intact. .  Cranial Nerves:  II: visual fields Full to confrontation   II: pupils Equal, round, reactive to light       III,VII: ptosis none   III,IV,VI: extraocular muscles  EOMI, no nystagmus or diplopia   V: facial light touch sensation  normal   VII: facial muscle function   symmetric   VIII: hearing intact   IX: soft palate elevation  normal   XI: trapezius strength  5/5   XI: sternocleidomastoid strength 5/5   XII: tongue  Midline     Motor: normal bulk and tone. She has some neck tremor off and on which is variable in amplitude and direction and improves with employing distraction maneuvers. Overall improved from the last visit                Strength: 5/5 throughout  Sensory: intact to pain, touch, temperature  Coordination: Finger-nose-finger intact  Gait: normal gait  Reflexes: 2+ symmetric           Assessment/Plan       ICD-10-CM ICD-9-CM    1.  Convulsive activity R56.9 780.39 REFERRAL TO NEUROLOGY     Patient with a variety of symptoms like speech difficulty, tremors, gait difficulty, abnormal involuntary movement episodes with extensive workup which has been negative/unremarkable so far. In particular there was no epileptiform correlate to the shaking episode that was captured on her routine EEGs here. She is reporting a change in the nature of these episodes and intends to pursue further workup. At this point in time, I do not have much else to add in terms of workup except setting her up for an EMU admission at [facility]. She has read about the [facility] epilepsy center and wishes to pursue any further workup there. I am making that referral.  I advised her not to drive pending further workup/consultation with [facility] neurologist/epileptologist.  Per her request, I provided her a note for work stating that she is going to undergo further workup at [facility]    I spent 20 out of 25 minutes counseling or coordinating care for convulsive activity. From here on patient will follow-up at [facility] and does not wish to schedule a follow-up appointment with us    This note was created using voice recognition software. Despite editing, there may be syntax and/or spelling errors. Signed:   Edgar Del Rosario MD  4/24/2018

## 2018-04-26 ENCOUNTER — DOCUMENTATION ONLY (OUTPATIENT)
Dept: NEUROLOGY | Age: 48
End: 2018-04-26

## 2018-04-30 ENCOUNTER — OFFICE VISIT (OUTPATIENT)
Dept: FAMILY MEDICINE CLINIC | Age: 48
End: 2018-04-30

## 2018-04-30 VITALS
HEART RATE: 70 BPM | HEIGHT: 67 IN | OXYGEN SATURATION: 99 % | WEIGHT: 194 LBS | DIASTOLIC BLOOD PRESSURE: 76 MMHG | SYSTOLIC BLOOD PRESSURE: 128 MMHG | RESPIRATION RATE: 16 BRPM | TEMPERATURE: 98.1 F | BODY MASS INDEX: 30.45 KG/M2

## 2018-04-30 DIAGNOSIS — R05.9 COUGH: ICD-10-CM

## 2018-04-30 DIAGNOSIS — R56.9 CONVULSIVE DISORDER (HCC): ICD-10-CM

## 2018-04-30 DIAGNOSIS — I10 HTN, GOAL BELOW 140/90: Primary | ICD-10-CM

## 2018-04-30 RX ORDER — LOSARTAN POTASSIUM 25 MG/1
25 TABLET ORAL DAILY
Qty: 30 TAB | Refills: 2 | Status: SHIPPED | OUTPATIENT
Start: 2018-04-30 | End: 2018-08-23 | Stop reason: SDUPTHER

## 2018-04-30 NOTE — PROGRESS NOTES
Chief Complaint   Patient presents with    Hypertension     follow up    Medication Evaluation     Lisinopril cause cough     1. Have you been to the ER, urgent care clinic since your last visit? Hospitalized since your last visit? No    2. Have you seen or consulted any other health care providers outside of the [address] [place] [address] since your last visit? Include any pap smears or colon screening.  No

## 2018-04-30 NOTE — PROGRESS NOTES
Patient Name: Taylor Rao   MRN: [id number]    [address] is a 41 y.o. female who presents with the following:     Patient reports long history of dry irritating cough. Believes it is secondary due to lisinopril. Symptoms have been slightly worse than usual lately, possibly due to allergies according to patient. Followed by neurology who recommended obtaining a second opinion at [facility] due to her ongoing convulsive activity without certain etiology. She has had a negative workup so far which has not identified any true seizure activity. They may possibly be psychogenic episodes. Patient does not want to see a psychiatrist at this time. Has been doing overall okay on the lower dose of Celexa since last visit. Her neurologist said not to drive doing workup at [facility] neurology. BP Readings from Last 3 Encounters:   04/30/18 128/76   04/23/18 124/86   03/23/18 118/80       Review of Systems   Constitutional: Negative for fever, malaise/fatigue and weight loss. Respiratory: Positive for cough. Negative for hemoptysis, shortness of breath and wheezing. Cardiovascular: Negative for chest pain, palpitations, leg swelling and PND. Gastrointestinal: Negative for abdominal pain, constipation, diarrhea, nausea and vomiting. Neurological: Positive for tremors. Negative for dizziness, sensory change, loss of consciousness and headaches. The patient's medications, allergies, past medical history, surgical history, family history and social history were reviewed and updated where appropriate. Prior to Admission medications    Medication Sig Start Date End Date Taking? Authorizing Provider   losartan (COZAAR) 25 mg tablet Take 1 Tab by mouth daily. Discontinue lisinopril 4/30/18  Yes Serg Teran MD   acetaminophen (TYLENOL EXTRA STRENGTH) 500 mg tablet Take 500 mg by mouth every six (6) hours as needed for Pain.    Yes Historical Provider   citalopram (CELEXA) 20 mg tablet Take 1 Tab by mouth daily. DOSE CHANGE 3/2/18  Yes Leonor Sharpe MD   amLODIPine (NORVASC) 10 mg tablet Take 1 Tab by mouth daily. 3/2/18  Yes Rico Pelletier MD   pantoprazole (PROTONIX) 40 mg tablet TAKE 1 TABLET BY MOUTH EVERY DAY BEFORE BREAKFAST 3/2/18  Yes Rico Pelletier MD   hydroCHLOROthiazide (HYDRODIURIL) 25 mg tablet TAKE 1 TABLET BY MOUTH EVERY DAY 3/2/18  Yes Leonor Sharpe MD   cholecalciferol, vitamin D3, (VITAMIN D3) 2,000 unit tab Take 2,000 Units by mouth daily. Yes Historical Provider       Allergies   Allergen Reactions    Propofol Other (comments)     Patient states she has a difficult time waking up           OBJECTIVE    Visit Vitals    /76 (BP 1 Location: Left arm, BP Patient Position: Sitting)    Pulse 70    Temp 98.1 °F (36.7 °C) (Oral)    Resp 16    Ht 5' 7\" (1.702 m)    Wt 194 lb (88 kg)    LMP  (Approximate)    SpO2 99%    BMI 30.38 kg/m2       Physical Exam   Constitutional: She is oriented to person, place, and time and well-developed, well-nourished, and in no distress. No distress. HENT:   Head: Normocephalic and atraumatic. Right Ear: Tympanic membrane is not perforated and not erythematous. No middle ear effusion. No decreased hearing is noted. Left Ear: Tympanic membrane is not perforated and not erythematous. No middle ear effusion. No decreased hearing is noted. Nose: Nose normal. Right sinus exhibits no maxillary sinus tenderness and no frontal sinus tenderness. Left sinus exhibits no maxillary sinus tenderness and no frontal sinus tenderness. Mouth/Throat: Uvula is midline, oropharynx is clear and moist and mucous membranes are normal.   Neck: Normal range of motion. Neck supple. Cardiovascular: Normal rate, regular rhythm and normal heart sounds. Exam reveals no gallop and no friction rub. No murmur heard. Pulmonary/Chest: Effort normal and breath sounds normal. No respiratory distress. She has no wheezes.    Lymphadenopathy:     She has no cervical adenopathy. Neurological: She is alert and oriented to person, place, and time. She displays tremor (mild head and speech tremor) and abnormal speech. Skin: She is not diaphoretic. Psychiatric: Mood, memory, affect and judgment normal.   Nursing note and vitals reviewed. ASSESSMENT AND PLAN  Jessica Cook is a 41 y.o. female who presents today for:    1. HTN, goal below 140/90  Continue lisinopril and switch to losartan to see if cough resolves with this medication change. - losartan (COZAAR) 25 mg tablet; Take 1 Tab by mouth daily. Discontinue lisinopril  Dispense: 30 Tab; Refill: 2    2. Convulsive disorder [facility])  Patient to follow-up with [facility] neurology for second opinion. Agree with the neurologist that she should not drive for now,  pending workup at [facility]. 3. Cough  Possibly due to medication side effect versus seasonal allergies. Recommend patient to stop lisinopril. May use Flonase daily for allergy coverage. Medications Discontinued During This Encounter   Medication Reason    lisinopril (PRINIVIL, ZESTRIL) 10 mg tablet Not A Current Medication       Follow-up Disposition:  Return in about 3 months (around 7/30/2018) for HTN follow up. Medication risks/benefits/costs/interactions/alternatives discussed with patient. Advised patient to call back or return to office if symptoms worsen/change/persist. If patient cannot reach us or should anything more severe/urgent arise he/she should proceed directly to the nearest emergency department. Discussed expected course/resolution/complications of diagnosis in detail with patient. Patient given a written after visit summary which includes his/her diagnoses, current medications and vitals. Patient expressed understanding with the diagnosis and plan.      Robert Galvan M.D.

## 2018-04-30 NOTE — MR AVS SNAPSHOT
[address] [facility] 
 
 
 [address] 
[phone] Patient: Lorenzo MRN: [id number] DXT:9/77/6330 Visit Information Date & Time Provider Department Dept. Phone Encounter #  
 4/30/2018  1:30 PM Joaquina Ellison  [facility] [phone] [id number] Follow-up Instructions Return in about 3 months (around 7/30/2018) for HTN follow up. Upcoming Health Maintenance Date Due Pneumococcal 19-64 Medium Risk (1 of 1 - PPSV23) 5/26/1991 Influenza Age 5 to Adult 8/1/2018 PAP AKA CERVICAL CYTOLOGY 6/15/2022 DTaP/Tdap/Td series (2 - Td) 8/3/2027 Allergies as of 4/30/2018  Review Complete On: 4/30/2018 By: Mesfin Seo Severity Noted Reaction Type Reactions Propofol  06/15/2017    Other (comments) Patient states she has a difficult time waking up Current Immunizations  Reviewed on 8/3/2017 Name Date Influenza Vaccine (Quad) PF 3/2/2018 Tdap 8/3/2017 Not reviewed this visit Vitals BP Pulse Temp Resp Height(growth percentile) Weight(growth percentile) 128/76 (BP 1 Location: Left arm, BP Patient Position: Sitting) 70 98.1 °F (36.7 °C) (Oral) 16 5' 7\" (1.702 m) 194 lb (88 kg) LMP SpO2 BMI OB Status Smoking Status (Approximate) 99% 30.38 kg/m2 Premenopausal Current Some Day Smoker Vitals History BMI and BSA Data Body Mass Index Body Surface Area  
 30.38 kg/m 2 2.04 m 2 Preferred Pharmacy Pharmacy Name Phone [id number] [place], [address] [facility] [phone] Your Updated Medication List  
  
   
This list is accurate as of 4/30/18  2:15 PM.  Always use your most recent med list. amLODIPine 10 mg tablet Commonly known as:  Dankp Newcomer Take 1 Tab by mouth daily. citalopram 20 mg tablet Commonly known as:  Juana Stanton Take 1 Tab by mouth daily. DOSE CHANGE  
  
 hydroCHLOROthiazide 25 mg tablet Commonly known as:  HYDRODIURIL  
TAKE 1 TABLET BY MOUTH EVERY DAY  
  
 losartan 25 mg tablet Commonly known as:  COZAAR Take 1 Tab by mouth daily. Discontinue lisinopril  
  
 pantoprazole 40 mg tablet Commonly known as:  PROTONIX  
TAKE 1 TABLET BY MOUTH EVERY DAY BEFORE BREAKFAST  
  
 TYLENOL EXTRA STRENGTH 500 mg tablet Generic drug:  acetaminophen Take 500 mg by mouth every six (6) hours as needed for Pain. VITAMIN D3 2,000 unit Tab Generic drug:  cholecalciferol (vitamin D3) Take 2,000 Units by mouth daily. Prescriptions Sent to Pharmacy Refills  
 losartan (COZAAR) 25 mg tablet 2 Sig: Take 1 Tab by mouth daily. Discontinue lisinopril Class: Normal  
 Pharmacy: [facility] #[id number], [address] [place] [address] #: [phone] Route: Oral  
  
Follow-up Instructions Return in about 3 months (around 7/30/2018) for HTN follow up. Patient Instructions DASH Diet: Care Instructions Your Care Instructions The DASH diet is an eating plan that can help lower your blood pressure. DASH stands for Dietary Approaches to Stop Hypertension. Hypertension is high blood pressure. The DASH diet focuses on eating foods that are high in calcium, potassium, and magnesium. These nutrients can lower blood pressure. The foods that are highest in these nutrients are fruits, vegetables, low-fat dairy products, nuts, seeds, and legumes. But taking calcium, potassium, and magnesium supplements instead of eating foods that are high in those nutrients does not have the same effect. The DASH diet also includes whole grains, fish, and poultry. The DASH diet is one of several lifestyle changes your doctor may recommend to lower your high blood pressure. Your doctor may also want you to decrease the amount of sodium in your diet.  Lowering sodium while following the DASH diet can lower blood pressure even further than just the DASH diet alone. Follow-up care is a key part of your treatment and safety. Be sure to make and go to all appointments, and call your doctor if you are having problems. It's also a good idea to know your test results and keep a list of the medicines you take. How can you care for yourself at home? Following the DASH diet · Eat 4 to 5 servings of fruit each day. A serving is 1 medium-sized piece of fruit, ½ cup chopped or canned fruit, 1/4 cup dried fruit, or 4 ounces (½ cup) of fruit juice. Choose fruit more often than fruit juice. · Eat 4 to 5 servings of vegetables each day. A serving is 1 cup of lettuce or raw leafy vegetables, ½ cup of chopped or cooked vegetables, or 4 ounces (½ cup) of vegetable juice. Choose vegetables more often than vegetable juice. · Get 2 to 3 servings of low-fat and fat-free dairy each day. A serving is 8 ounces of milk, 1 cup of yogurt, or 1 ½ ounces of cheese. · Eat 6 to 8 servings of grains each day. A serving is 1 slice of bread, 1 ounce of dry cereal, or ½ cup of cooked rice, pasta, or cooked cereal. Try to choose whole-grain products as much as possible. · Limit lean meat, poultry, and fish to 2 servings each day. A serving is 3 ounces, about the size of a deck of cards. · Eat 4 to 5 servings of nuts, seeds, and legumes (cooked dried beans, lentils, and split peas) each week. A serving is 1/3 cup of nuts, 2 tablespoons of seeds, or ½ cup of cooked beans or peas. · Limit fats and oils to 2 to 3 servings each day. A serving is 1 teaspoon of vegetable oil or 2 tablespoons of salad dressing. · Limit sweets and added sugars to 5 servings or less a week. A serving is 1 tablespoon jelly or jam, ½ cup sorbet, or 1 cup of lemonade. · Eat less than 2,300 milligrams (mg) of sodium a day. If you limit your sodium to 1,500 mg a day, you can lower your blood pressure even more. Tips for success · Start small. Do not try to make dramatic changes to your diet all at once. You might feel that you are missing out on your favorite foods and then be more likely to not follow the plan. Make small changes, and stick with them. Once those changes become habit, add a few more changes. · Try some of the following: ¨ Make it a goal to eat a fruit or vegetable at every meal and at snacks. This will make it easy to get the recommended amount of fruits and vegetables each day. ¨ Try yogurt topped with fruit and nuts for a snack or healthy dessert. ¨ Add lettuce, tomato, cucumber, and onion to sandwiches. ¨ Combine a ready-made pizza crust with low-fat mozzarella cheese and lots of vegetable toppings. Try using tomatoes, squash, spinach, broccoli, carrots, cauliflower, and onions. ¨ Have a variety of cut-up vegetables with a low-fat dip as an appetizer instead of chips and dip. ¨ Sprinkle sunflower seeds or chopped almonds over salads. Or try adding chopped walnuts or almonds to cooked vegetables. ¨ Try some vegetarian meals using beans and peas. Add garbanzo or kidney beans to salads. Make burritos and tacos with mashed koch beans or black beans. Where can you learn more? Go to http://darcie-jessi.info/. Enter G202 in the search box to learn more about \"DASH Diet: Care Instructions. \" Current as of: September 21, 2016 Content Version: 11.4 © 9202-2078 Feedback. Care instructions adapted under license by [employer] (which disclaims liability or warranty for this information). If you have questions about a medical condition or this instruction, always ask your healthcare professional. [place] [address] any warranty or liability for your use of this information. Introducing [facility] & HEALTH SERVICES!    
 [facility] introduces MySocialNightlife patient portal. Now you can access parts of your medical record, email your doctor's office, and request medication refills online. 1. In your internet browser, go to [email or web address] 2. Click on the First Time User? Click Here link in the Sign In box. You will see the New Member Sign Up page. 3. Enter your NetzVacation Access Code exactly as it appears below. You will not need to use this code after youve completed the sign-up process. If you do not sign up before the expiration date, you must request a new code. · NetzVacation Access Code: [email or web address] Expires: 5/23/2018 12:12 PM 
 
4. Enter the last four digits of your Social Security Number (xxxx) and Date of Birth (mm/dd/yyyy) as indicated and click Submit. You will be taken to the next sign-up page. 5. Create a NetzVacation ID. This will be your NetzVacation login ID and cannot be changed, so think of one that is secure and easy to remember. 6. Create a NetzVacation password. You can change your password at any time. 7. Enter your Password Reset Question and Answer. This can be used at a later time if you forget your password. 8. Enter your e-mail address. You will receive e-mail notification when new information is available in [address] 9. Click Sign Up. You can now view and download portions of your medical record. 10. Click the Download Summary menu link to download a portable copy of your medical information. If you have questions, please visit the Frequently Asked Questions section of the NetzVacation website. Remember, NetzVacation is NOT to be used for urgent needs. For medical emergencies, dial 911. Now available from your iPhone and Android! Please provide this summary of care documentation to your next provider. Your primary care clinician is listed as Rico Pelletier. If you have any questions after today's visit, please call [phone].

## 2018-04-30 NOTE — PATIENT INSTRUCTIONS

## 2018-05-22 ENCOUNTER — TELEPHONE (OUTPATIENT)
Dept: FAMILY MEDICINE CLINIC | Age: 48
End: 2018-05-22

## 2018-05-22 NOTE — TELEPHONE ENCOUNTER
Returned call to patient, NORM verified. Informed patient that there is an order in for a mammogram. Patient stated she does not need a referral to see a specialist. Advised patient to contact her insurance to find out which gastroenterologist is in her network. Patient verbalized understanding.

## 2018-05-22 NOTE — TELEPHONE ENCOUNTER
Patient is calling, she is requesting that a order to go to a gastro physician for a colonoscopy and also a order for a mammogram be put into the system, she states that she is due for these two tests.      Best call back # for patient: [phone]

## 2018-08-24 ENCOUNTER — TELEPHONE (OUTPATIENT)
Dept: FAMILY MEDICINE CLINIC | Age: 48
End: 2018-08-24

## 2018-08-24 ENCOUNTER — OFFICE VISIT (OUTPATIENT)
Dept: FAMILY MEDICINE CLINIC | Age: 48
End: 2018-08-24

## 2018-08-24 VITALS
WEIGHT: 193.6 LBS | HEART RATE: 62 BPM | BODY MASS INDEX: 30.39 KG/M2 | RESPIRATION RATE: 18 BRPM | TEMPERATURE: 98.7 F | SYSTOLIC BLOOD PRESSURE: 140 MMHG | HEIGHT: 67 IN | DIASTOLIC BLOOD PRESSURE: 90 MMHG | OXYGEN SATURATION: 98 %

## 2018-08-24 DIAGNOSIS — E74.39 GLUCOSE INTOLERANCE: ICD-10-CM

## 2018-08-24 DIAGNOSIS — Z13.220 SCREENING FOR LIPID DISORDERS: ICD-10-CM

## 2018-08-24 DIAGNOSIS — M79.641 PAIN IN BOTH HANDS: ICD-10-CM

## 2018-08-24 DIAGNOSIS — M79.642 PAIN IN BOTH HANDS: ICD-10-CM

## 2018-08-24 DIAGNOSIS — I10 HTN, GOAL BELOW 140/90: Primary | ICD-10-CM

## 2018-08-24 DIAGNOSIS — Z13.1 SCREENING FOR DIABETES MELLITUS: ICD-10-CM

## 2018-08-24 DIAGNOSIS — R20.0 NUMBNESS OF TOES: ICD-10-CM

## 2018-08-24 DIAGNOSIS — E55.9 VITAMIN D DEFICIENCY: ICD-10-CM

## 2018-08-24 DIAGNOSIS — Z11.3 SCREENING FOR STD (SEXUALLY TRANSMITTED DISEASE): ICD-10-CM

## 2018-08-24 RX ORDER — DICLOFENAC SODIUM 10 MG/G
2 GEL TOPICAL 4 TIMES DAILY
Qty: 100 G | Refills: 0 | Status: SHIPPED | OUTPATIENT
Start: 2018-08-24 | End: 2018-10-25 | Stop reason: SDUPTHER

## 2018-08-24 RX ORDER — NAPROXEN SODIUM 220 MG
220 TABLET ORAL 2 TIMES DAILY WITH MEALS
COMMUNITY

## 2018-08-24 RX ORDER — LOSARTAN POTASSIUM 25 MG/1
TABLET ORAL
Qty: 90 TAB | Refills: 1 | Status: SHIPPED | OUTPATIENT
Start: 2018-08-24 | End: 2019-12-31 | Stop reason: SDUPTHER

## 2018-08-24 RX ORDER — AMLODIPINE BESYLATE 10 MG/1
TABLET ORAL
Qty: 90 TAB | Refills: 1 | Status: SHIPPED | OUTPATIENT
Start: 2018-08-24 | End: 2019-01-03 | Stop reason: SDUPTHER

## 2018-08-24 NOTE — MR AVS SNAPSHOT
[place] [place] 
 
 
 [address] 
[phone] Patient: Lorenzo MRN: [id number] IMI:1/48/2532 Visit Information Date & Time Provider Department Dept. Phone Encounter #  
 8/24/2018  8:15 AM Mark Black MD [address] [place] [address] [phone] [id number] Follow-up Instructions Return in about 3 months (around 11/24/2018) for HTN follow up. Upcoming Health Maintenance Date Due Pneumococcal 19-64 Medium Risk (1 of 1 - PPSV23) 5/26/1991 Influenza Age 5 to Adult 8/1/2018 PAP AKA CERVICAL CYTOLOGY 6/15/2022 DTaP/Tdap/Td series (2 - Td) 8/3/2027 Allergies as of 8/24/2018  Review Complete On: 8/24/2018 By: Mark Black MD  
  
 Severity Noted Reaction Type Reactions Propofol  06/15/2017    Other (comments) Patient states she has a difficult time waking up Current Immunizations  Reviewed on 8/3/2017 Name Date Influenza Vaccine (Quad) PF 3/2/2018 Tdap 8/3/2017 Not reviewed this visit You Were Diagnosed With   
  
 Codes Comments HTN, goal below 140/90    -  Primary ICD-10-CM: I10 
ICD-9-CM: 401.9 Vitamin D deficiency     ICD-10-CM: E55.9 ICD-9-CM: 268.9 Glucose intolerance     ICD-10-CM: E74.39 
ICD-9-CM: 271.3 Pain in both hands     ICD-10-CM: M79.641, J69.267 ICD-9-CM: 729.5 Numbness of toes     ICD-10-CM: R20.0 ICD-9-CM: 782.0 Screening for lipid disorders     ICD-10-CM: Z13.220 ICD-9-CM: V77.91 Screening for diabetes mellitus     ICD-10-CM: Z13.1 ICD-9-CM: V77.1 Screening for STD (sexually transmitted disease)     ICD-10-CM: Z11.3 ICD-9-CM: V74.5 Vitals BP Pulse Temp Resp Height(growth percentile) Weight(growth percentile) 140/90 (BP 1 Location: Right arm, BP Patient Position: Sitting) 62 98.7 °F (37.1 °C) (Oral) 18 5' 7\" (1.702 m) 193 lb 9.6 oz (87.8 kg) SpO2 BMI OB Status Smoking Status 98% 30.32 kg/m2 Premenopausal Current Some Day Smoker Vitals History BMI and BSA Data Body Mass Index Body Surface Area  
 30.32 kg/m 2 2.04 m 2 Preferred Pharmacy Pharmacy Name Phone [id number]- [place] [facility] [phone] Your Updated Medication List  
  
   
This list is accurate as of 8/24/18  9:00 AM.  Always use your most recent med list.  
  
  
  
  
 ALEVE 220 mg tablet Generic drug:  naproxen sodium Take 220 mg by mouth two (2) times daily (with meals). amLODIPine 10 mg tablet Commonly known as:  Jose Miguel Ruths TAKE 1 TAB BY MOUTH DAILY. citalopram 20 mg tablet Commonly known as:  CELEXA  
TAKE 1 TABLET BY MOUTH DAILY  
  
 diclofenac 1 % Gel Commonly known as:  VOLTAREN Apply 2 g to affected area four (4) times daily. losartan 25 mg tablet Commonly known as:  COZAAR  
TAKE 1 TAB BY MOUTH DAILY. VITAMIN D3 2,000 unit Tab Generic drug:  cholecalciferol (vitamin D3) Take 2,000 Units by mouth daily. Prescriptions Sent to Pharmacy Refills  
 amLODIPine (NORVASC) 10 mg tablet 1 Sig: TAKE 1 TAB BY MOUTH DAILY. Class: Normal  
 Pharmacy: [facility]/pharmacy #[id number]- [address] Ph #: [phone]  
 losartan (COZAAR) 25 mg tablet 1 Sig: TAKE 1 TAB BY MOUTH DAILY. Class: Normal  
 Pharmacy: [facility]/pharmacy #[id number]- [address] Ph #: [phone]  
 diclofenac (VOLTAREN) 1 % gel 0 Sig: Apply 2 g to affected area four (4) times daily. Class: Normal  
 Pharmacy: [facility]/pharmacy #[id number] Ph #: [phone] Route: Topical  
  
We Performed the Following CBC WITH AUTOMATED DIFF [00341 CPT(R)] CHLAMYDIA/GC PCR [60951 CPT(R)] HCV AB W/RFLX TO MYRIAM [78200 CPT(R)] HEMOGLOBIN A1C WITH EAG [24736 CPT(R)] HIV 1/2 AG/AB, 4TH GENERATION,W RFLX CONFIRM [id number] CPT(R)] LIPID PANEL [55171 CPT(R)] METABOLIC PANEL, COMPREHENSIVE [86472 CPT(R)] RPR [15580 CPT(R)] TSH AND FREE T4 [56227 CPT(R)] VITAMIN D, 25 HYDROXY [id number] CPT(R)] Follow-up Instructions Return in about 3 months (around 11/24/2018) for HTN follow up. Patient Instructions DASH Diet: Care Instructions Your Care Instructions The DASH diet is an eating plan that can help lower your blood pressure. DASH stands for Dietary Approaches to Stop Hypertension. Hypertension is high blood pressure. The DASH diet focuses on eating foods that are high in calcium, potassium, and magnesium. These nutrients can lower blood pressure. The foods that are highest in these nutrients are fruits, vegetables, low-fat dairy products, nuts, seeds, and legumes. But taking calcium, potassium, and magnesium supplements instead of eating foods that are high in those nutrients does not have the same effect. The DASH diet also includes whole grains, fish, and poultry. The DASH diet is one of several lifestyle changes your doctor may recommend to lower your high blood pressure. Your doctor may also want you to decrease the amount of sodium in your diet. Lowering sodium while following the DASH diet can lower blood pressure even further than just the DASH diet alone. Follow-up care is a key part of your treatment and safety. Be sure to make and go to all appointments, and call your doctor if you are having problems. It's also a good idea to know your test results and keep a list of the medicines you take. How can you care for yourself at home? Following the DASH diet · Eat 4 to 5 servings of fruit each day. A serving is 1 medium-sized piece of fruit, ½ cup chopped or canned fruit, 1/4 cup dried fruit, or 4 ounces (½ cup) of fruit juice. Choose fruit more often than fruit juice. · Eat 4 to 5 servings of vegetables each day. A serving is 1 cup of lettuce or raw leafy vegetables, ½ cup of chopped or cooked vegetables, or 4 ounces (½ cup) of vegetable juice. Choose vegetables more often than vegetable juice. · Get 2 to 3 servings of low-fat and fat-free dairy each day. A serving is 8 ounces of milk, 1 cup of yogurt, or 1 ½ ounces of cheese. · Eat 6 to 8 servings of grains each day. A serving is 1 slice of bread, 1 ounce of dry cereal, or ½ cup of cooked rice, pasta, or cooked cereal. Try to choose whole-grain products as much as possible. · Limit lean meat, poultry, and fish to 2 servings each day. A serving is 3 ounces, about the size of a deck of cards. · Eat 4 to 5 servings of nuts, seeds, and legumes (cooked dried beans, lentils, and split peas) each week. A serving is 1/3 cup of nuts, 2 tablespoons of seeds, or ½ cup of cooked beans or peas. · Limit fats and oils to 2 to 3 servings each day. A serving is 1 teaspoon of vegetable oil or 2 tablespoons of salad dressing. · Limit sweets and added sugars to 5 servings or less a week. A serving is 1 tablespoon jelly or jam, ½ cup sorbet, or 1 cup of lemonade. · Eat less than 2,300 milligrams (mg) of sodium a day. If you limit your sodium to 1,500 mg a day, you can lower your blood pressure even more. Tips for success · Start small. Do not try to make dramatic changes to your diet all at once. You might feel that you are missing out on your favorite foods and then be more likely to not follow the plan. Make small changes, and stick with them. Once those changes become habit, add a few more changes. · Try some of the following: ¨ Make it a goal to eat a fruit or vegetable at every meal and at snacks. This will make it easy to get the recommended amount of fruits and vegetables each day. ¨ Try yogurt topped with fruit and nuts for a snack or healthy dessert. ¨ Add lettuce, tomato, cucumber, and onion to sandwiches. ¨ Combine a ready-made pizza crust with low-fat mozzarella cheese and lots of vegetable toppings. Try using tomatoes, squash, spinach, broccoli, carrots, cauliflower, and onions. ¨ Have a variety of cut-up vegetables with a low-fat dip as an appetizer instead of chips and dip. ¨ Sprinkle sunflower seeds or chopped almonds over salads. Or try adding chopped walnuts or almonds to cooked vegetables. ¨ Try some vegetarian meals using beans and peas. Add garbanzo or kidney beans to salads. Make burritos and tacos with mashed koch beans or black beans. Where can you learn more? Go to http://darcie-jessi.info/. Enter K820 in the search box to learn more about \"DASH Diet: Care Instructions. \" Current as of: December 6, 2017 Content Version: 11.7 © 0770-7052 Incomparable Things. Care instructions adapted under license by "[employer] (which disclaims liability or warranty for this information). If you have questions about a medical condition or this instruction, always ask your healthcare professional. [address] any warranty or liability for your use of this information. Introducing [facility]! Kristel Dye introduces [email or web address] patient portal. Now you can access parts of your medical record, email your doctor's office, and request medication refills online. 1. In your internet browser, go to [email or web address] 2. Click on the First Time User? Click Here link in the Sign In box. You will see the New Member Sign Up page. 3. Enter your [email or web address] Access Code exactly as it appears below. You will not need to use this code after youve completed the sign-up process. If you do not sign up before the expiration date, you must request a new code. · [email or web address] Access Code: [email or web address] Expires: 9/9/2018  5:20 AM 
 
4.  Enter the last four digits of your Social Security Number (xxxx) and Date of Birth (mm/dd/yyyy) as indicated and click Submit. You will be taken to the next sign-up page. 5. Create a Phorest ID. This will be your Phorest login ID and cannot be changed, so think of one that is secure and easy to remember. 6. Create a Phorest password. You can change your password at any time. 7. Enter your Password Reset Question and Answer. This can be used at a later time if you forget your password. 8. Enter your e-mail address. You will receive e-mail notification when new information is available in [address] 9. Click Sign Up. You can now view and download portions of your medical record. 10. Click the Download Summary menu link to download a portable copy of your medical information. If you have questions, please visit the Frequently Asked Questions section of the Phorest website. Remember, Phorest is NOT to be used for urgent needs. For medical emergencies, dial 911. Now available from your iPhone and Android! Please provide this summary of care documentation to your next provider. Your primary care clinician is listed as Rico Pelletier. If you have any questions after today's visit, please call [phone].

## 2018-08-24 NOTE — PROGRESS NOTES
Chief Complaint   Patient presents with    Hypertension     follow up     1. Have you been to the ER, urgent care clinic since your last visit? Hospitalized since your last visit? No    2. Have you seen or consulted any other health care providers outside of the [address] [place] [address] since your last visit? Include any pap smears or colon screening.  No

## 2018-08-24 NOTE — TELEPHONE ENCOUNTER
Patient is calling requesting a return to work letter be printed and be ready for  by tomorrow.   Last seen :  August 24, 2018 08:15 am  Best call back : [phone]

## 2018-08-24 NOTE — PROGRESS NOTES
Patient Name: Kathe San   MRN: [id number]    [address] is a 57 y.o. female who presents with the following:     Since last visit, switched from lisinopril to losartan due to prior symptoms of dry cough. Patient states that the cough has resolved. She also states that she stopped taking hydrochlorothiazide over 3 months ago. Did not take her medications this morning. Recently started a job at [employer] and works with her hands all day. Has begun to have pain and mild swelling in both hands of which she will take Aleve multiple times a day. Denies any stomach side effects. Also reports 1 week history of consistent numbness in her left big toe. Denies any trauma, injury, swelling, redness. Would like STD screening; remains asymptomatic. Was thinking about getting a tummy tuck and breast reduction surgery with plastic surgery next year. Was inquiring how she should prepare for the surgery. She has not yet seen [facility] neurology but will plan to reschedule that appointment. Says she has not had a tremor episode since June. BP Readings from Last 3 Encounters:   08/24/18 140/90   04/30/18 128/76   04/23/18 124/86         Review of Systems   Constitutional: Negative for fever, malaise/fatigue and weight loss. Respiratory: Negative for cough, hemoptysis, shortness of breath and wheezing. Cardiovascular: Negative for chest pain, palpitations, leg swelling and PND. Gastrointestinal: Negative for abdominal pain, constipation, diarrhea, nausea and vomiting. Musculoskeletal: Positive for joint pain. Neurological: Positive for tingling. The patient's medications, allergies, past medical history, surgical history, family history and social history were reviewed and updated where appropriate. Prior to Admission medications    Medication Sig Start Date End Date Taking?  Authorizing Provider   naproxen sodium (ALEVE) 220 mg tablet Take 220 mg by mouth two (2) times daily (with meals). Yes Historical Provider   losartan (COZAAR) 25 mg tablet TAKE 1 TAB BY MOUTH DAILY. DISCONTINUE LISINOPRIL 8/23/18  Yes Rico Pelletier MD   hydroCHLOROthiazide (HYDRODIURIL) 25 mg tablet TAKE 1 TABLET BY MOUTH EVERY DAY 6/7/18  Yes Rico Pelletier MD   amLODIPine (NORVASC) 10 mg tablet TAKE 1 TAB BY MOUTH DAILY. 6/7/18  Yes Renato Melton MD   citalopram (CELEXA) 20 mg tablet TAKE 1 TABLET BY MOUTH DAILY 6/7/18  Yes Renato Melton MD   cholecalciferol, vitamin D3, (VITAMIN D3) 2,000 unit tab Take 2,000 Units by mouth daily. Yes Historical Provider       Allergies   Allergen Reactions    Propofol Other (comments)     Patient states she has a difficult time waking up           OBJECTIVE    Visit Vitals    /90 (BP 1 Location: Right arm, BP Patient Position: Sitting)    Pulse 62    Temp 98.7 °F (37.1 °C) (Oral)    Resp 18    Ht 5' 7\" (1.702 m)    Wt 193 lb 9.6 oz (87.8 kg)    SpO2 98%    BMI 30.32 kg/m2       Physical Exam   Constitutional: She is oriented to person, place, and time and well-developed, well-nourished, and in no distress. No distress. Eyes: Conjunctivae and EOM are normal. Pupils are equal, round, and reactive to light. Musculoskeletal: Normal range of motion. Right hand: Normal. She exhibits normal range of motion, no tenderness, no bony tenderness, normal capillary refill, no laceration and no swelling. Normal sensation noted. Normal strength noted. Left hand: She exhibits normal range of motion, no tenderness, no bony tenderness, normal capillary refill, no deformity and no swelling. Normal sensation noted. Normal strength noted. Left foot: There is normal range of motion, no tenderness, no crepitus and no deformity. Reports decrease sensation along distal 1st MTP of L foot   Neurological: She is alert and oriented to person, place, and time. Gait normal.   Skin: Skin is warm and dry. She is not diaphoretic.    Psychiatric: Mood, memory, affect and judgment normal.   Nursing note and vitals reviewed. ASSESSMENT AND PLAN  Jessica Duran is a 57 y.o. female who presents today for:    1. HTN, goal below 140/90  Borderline elevated in setting of holding BP meds this morning. Stable, continue current treatment. - losartan (COZAAR) 25 mg tablet; TAKE 1 TAB BY MOUTH DAILY. Dispense: 90 Tab; Refill: 1    2. Vitamin D deficiency  Stable, continue current treatment pending review of labs. - VITAMIN D, 25 HYDROXY    3. Glucose intolerance  If elevated, may be contributing to toe numbness.  - HEMOGLOBIN A1C WITH EAG    4. Pain in both hands  Likely overuse injury given new job. Pt states her work has a program where if she still has pain after 8 weeks of work, they will reassign her. Recommend topical NSAIDs over oral NSAID use given HTN; may use Tylenol instead. - diclofenac (VOLTAREN) 1 % gel; Apply 2 g to affected area four (4) times daily. Dispense: 100 g; Refill: 0    5. Numbness of toes  Will r/o below etiologies. - TSH AND FREE T4  - CBC WITH AUTOMATED DIFF    6. Screening for lipid disorders  - METABOLIC PANEL, COMPREHENSIVE  - LIPID PANEL    7. Screening for diabetes mellitus  - HEMOGLOBIN A1C WITH EAG    8. Screening for STD (sexually transmitted disease)  - HIV 1/2 AG/AB, 4TH GENERATION,W RFLX CONFIRM  - CHLAMYDIA/GC PCR  - HCV AB W/RFLX TO MYRIAM  - RPR    9. BMI 30.0-30.9,adult  I have reviewed/discussed the above normal BMI with the patient. I have recommended the following interventions: dietary management education, guidance, and counseling, encourage exercise, monitor weight and prescribed dietary intake.         Medications Discontinued During This Encounter   Medication Reason    pantoprazole (PROTONIX) 40 mg tablet Not A Current Medication    acetaminophen (TYLENOL EXTRA STRENGTH) 500 mg tablet Not A Current Medication    hydroCHLOROthiazide (HYDRODIURIL) 25 mg tablet Not A Current Medication    amLODIPine (NORVASC) 10 mg tablet Reorder    losartan (COZAAR) 25 mg tablet Reorder       Follow-up Disposition:  Return in about 3 months (around 11/24/2018) for HTN follow up. Medication risks/benefits/costs/interactions/alternatives discussed with patient. Advised patient to call back or return to office if symptoms worsen/change/persist. If patient cannot reach us or should anything more severe/urgent arise he/she should proceed directly to the nearest emergency department. Discussed expected course/resolution/complications of diagnosis in detail with patient. Patient given a written after visit summary which includes his/her diagnoses, current medications and vitals. Patient expressed understanding with the diagnosis and plan.      Diana Zee M.D.

## 2018-08-24 NOTE — LETTER
NOTIFICATION RETURN TO WORK / SCHOOL 
 
8/24/2018 4:00 PM 
 
Ms. Paulina Baeza [address] [facility] [address] To Whom It May Concern: 
 
Paulina Baeza is currently under the care of HERBERT Lal. She will return to work/school on: 8/27/18 If there are questions or concerns please have the patient contact our office. Sincerely, Dave Soni MD

## 2018-08-24 NOTE — TELEPHONE ENCOUNTER
Patient states her fingers and knuckles are still swollen. She just got the prescription for the diclofenac gel. She is unable to go back to work today and is requested pcp write a note for return to work on Monday.  8/27/18

## 2018-08-25 LAB
25(OH)D3+25(OH)D2 SERPL-MCNC: 23.9 NG/ML (ref 30–100)
ALBUMIN SERPL-MCNC: 4.2 G/DL (ref 3.5–5.5)
ALBUMIN/GLOB SERPL: 1.4 {RATIO} (ref 1.2–2.2)
ALP SERPL-CCNC: 70 IU/L (ref 39–117)
ALT SERPL-CCNC: 13 IU/L (ref 0–32)
AST SERPL-CCNC: 14 IU/L (ref 0–40)
BASOPHILS # BLD AUTO: 0 X10E3/UL (ref 0–0.2)
BASOPHILS NFR BLD AUTO: 0 %
BILIRUB SERPL-MCNC: 0.4 MG/DL (ref 0–1.2)
BUN SERPL-MCNC: 12 MG/DL (ref 6–24)
BUN/CREAT SERPL: 15 (ref 9–23)
CALCIUM SERPL-MCNC: 9.4 MG/DL (ref 8.7–10.2)
CHLORIDE SERPL-SCNC: 107 MMOL/L (ref 96–106)
CHOLEST SERPL-MCNC: 189 MG/DL (ref 100–199)
CO2 SERPL-SCNC: 19 MMOL/L (ref 20–29)
CREAT SERPL-MCNC: 0.81 MG/DL (ref 0.57–1)
EOSINOPHIL # BLD AUTO: 0.1 X10E3/UL (ref 0–0.4)
EOSINOPHIL NFR BLD AUTO: 3 %
ERYTHROCYTE [DISTWIDTH] IN BLOOD BY AUTOMATED COUNT: 14 % (ref 12.3–15.4)
EST. AVERAGE GLUCOSE BLD GHB EST-MCNC: 117 MG/DL
GLOBULIN SER CALC-MCNC: 2.9 G/DL (ref 1.5–4.5)
GLUCOSE SERPL-MCNC: 92 MG/DL (ref 65–99)
HBA1C MFR BLD: 5.7 % (ref 4.8–5.6)
HCT VFR BLD AUTO: 38.6 % (ref 34–46.6)
HCV AB S/CO SERPL IA: <0.1 S/CO RATIO (ref 0–0.9)
HCV AB SERPL QL IA: NORMAL
HDLC SERPL-MCNC: 62 MG/DL
HGB BLD-MCNC: 12.6 G/DL (ref 11.1–15.9)
HIV 1+2 AB+HIV1 P24 AG SERPL QL IA: NON REACTIVE
IMM GRANULOCYTES # BLD: 0 X10E3/UL (ref 0–0.1)
IMM GRANULOCYTES NFR BLD: 0 %
INTERPRETATION, 910389: NORMAL
LDLC SERPL CALC-MCNC: 114 MG/DL (ref 0–99)
LYMPHOCYTES # BLD AUTO: 2.3 X10E3/UL (ref 0.7–3.1)
LYMPHOCYTES NFR BLD AUTO: 44 %
MCH RBC QN AUTO: 28.8 PG (ref 26.6–33)
MCHC RBC AUTO-ENTMCNC: 32.6 G/DL (ref 31.5–35.7)
MCV RBC AUTO: 88 FL (ref 79–97)
MONOCYTES # BLD AUTO: 0.4 X10E3/UL (ref 0.1–0.9)
MONOCYTES NFR BLD AUTO: 8 %
NEUTROPHILS # BLD AUTO: 2.3 X10E3/UL (ref 1.4–7)
NEUTROPHILS NFR BLD AUTO: 45 %
PLATELET # BLD AUTO: 325 X10E3/UL (ref 150–379)
POTASSIUM SERPL-SCNC: 4.1 MMOL/L (ref 3.5–5.2)
PROT SERPL-MCNC: 7.1 G/DL (ref 6–8.5)
RBC # BLD AUTO: 4.38 X10E6/UL (ref 3.77–5.28)
RPR SER QL: NON REACTIVE
SODIUM SERPL-SCNC: 140 MMOL/L (ref 134–144)
T4 FREE SERPL-MCNC: 1.32 NG/DL (ref 0.82–1.77)
TRIGL SERPL-MCNC: 65 MG/DL (ref 0–149)
TSH SERPL DL<=0.005 MIU/L-ACNC: 2.52 UIU/ML (ref 0.45–4.5)
VLDLC SERPL CALC-MCNC: 13 MG/DL (ref 5–40)
WBC # BLD AUTO: 5.1 X10E3/UL (ref 3.4–10.8)

## 2018-08-26 LAB
C TRACH RRNA SPEC QL NAA+PROBE: NEGATIVE
N GONORRHOEA RRNA SPEC QL NAA+PROBE: NEGATIVE

## 2018-08-27 RX ORDER — ASPIRIN 325 MG
TABLET, DELAYED RELEASE (ENTERIC COATED) ORAL
Qty: 8 CAP | Refills: 0 | Status: SHIPPED | OUTPATIENT
Start: 2018-08-27 | End: 2019-08-06 | Stop reason: ALTCHOICE

## 2018-08-27 NOTE — PROGRESS NOTES
Please notify patient regarding their test results:    Vitamin D levels are low. Recommend prescription for high dose vitamin D3 at 50,000 units once a week for 8 weeks. After 8 weeks, switch to a lower dose of over-the-counter vitamin D3 2000 units every day. Hemoglobin A1C (average blood sugar level for past 3 months) is in the pre diabetes range, which means your average sugar or glucose level is higher than normal. Also has high LDL cholesterol. I would encourage healthy diets and regular exercise with the goal of maintaining a healthy weight before starting medications for this. STD testing all negative. Thyroid levels and blood counts wnl.

## 2018-08-28 NOTE — PROGRESS NOTES
Spoke to patient verified  informed her of the following. Vitamin D levels are low. Recommend prescription for high dose vitamin D3 at 50,000 units once a week for 8 weeks. After 8 weeks, switch to a lower dose of over-the-counter vitamin D3 2000 units every day. Hemoglobin A1C (average blood sugar level for past 3 months) is in the pre diabetes range, which means your average sugar or glucose level is higher than normal. Also has high LDL cholesterol. I would encourage healthy diets and regular exercise with the goal of maintaining a healthy weight before starting medications for this. STD testing all negative. Thyroid levels and blood counts wnl. Patient will follow all recommendations.

## 2018-10-25 DIAGNOSIS — M79.642 PAIN IN BOTH HANDS: ICD-10-CM

## 2018-10-25 DIAGNOSIS — M79.641 PAIN IN BOTH HANDS: ICD-10-CM

## 2018-10-25 RX ORDER — DICLOFENAC SODIUM 10 MG/G
GEL TOPICAL
Qty: 100 G | Refills: 0 | Status: SHIPPED | OUTPATIENT
Start: 2018-10-25 | End: 2019-05-03 | Stop reason: SDUPTHER

## 2019-01-03 NOTE — TELEPHONE ENCOUNTER
Pharm on file verified. They are requesting a refill for 90-days supply on the following meds,    LOV 08/24/18  Last refill. 08/24/18    Requested Prescriptions     Pending Prescriptions Disp Refills    amLODIPine (NORVASC) 10 mg tablet 90 Tab 1     Sig: TAKE 1 TAB BY MOUTH DAILY.

## 2019-01-04 RX ORDER — AMLODIPINE BESYLATE 10 MG/1
TABLET ORAL
Qty: 90 TAB | Refills: 1 | Status: SHIPPED | OUTPATIENT
Start: 2019-01-04 | End: 2020-01-17 | Stop reason: SDUPTHER

## 2019-01-05 ENCOUNTER — HOSPITAL ENCOUNTER (EMERGENCY)
Age: 49
Discharge: HOME | End: 2019-01-05
Attending: EMERGENCY MEDICINE
Payer: COMMERCIAL

## 2019-01-05 VITALS
BODY MASS INDEX: 31.11 KG/M2 | HEIGHT: 67 IN | SYSTOLIC BLOOD PRESSURE: 194 MMHG | RESPIRATION RATE: 16 BRPM | WEIGHT: 198.2 LBS | TEMPERATURE: 98.4 F | OXYGEN SATURATION: 97 % | HEART RATE: 71 BPM | DIASTOLIC BLOOD PRESSURE: 116 MMHG

## 2019-01-05 DIAGNOSIS — R25.9 ABNORMAL MOVEMENTS: ICD-10-CM

## 2019-01-05 DIAGNOSIS — I10 ESSENTIAL HYPERTENSION: Primary | ICD-10-CM

## 2019-01-05 PROCEDURE — 74011250637 HC RX REV CODE- 250/637: Performed by: EMERGENCY MEDICINE

## 2019-01-05 PROCEDURE — 99283 EMERGENCY DEPT VISIT LOW MDM: CPT

## 2019-01-05 RX ORDER — HYDROXYZINE 50 MG/1
50 TABLET, FILM COATED ORAL
Status: COMPLETED | OUTPATIENT
Start: 2019-01-05 | End: 2019-01-05

## 2019-01-05 RX ORDER — HYDROXYZINE 50 MG/1
TABLET, FILM COATED ORAL
Status: DISCONTINUED
Start: 2019-01-05 | End: 2019-01-05 | Stop reason: HOSPADM

## 2019-01-05 RX ORDER — HYDROXYZINE 50 MG/1
50 TABLET, FILM COATED ORAL
Qty: 20 TAB | Refills: 0 | Status: SHIPPED | OUTPATIENT
Start: 2019-01-05 | End: 2019-01-15

## 2019-01-05 RX ADMIN — HYDROXYZINE HYDROCHLORIDE 50 MG: 50 TABLET, FILM COATED ORAL at 01:46

## 2019-01-05 NOTE — ED PROVIDER NOTES
57 y.o. female with past medical history significant for hypercholesterolemia, HTN, chronic headaches, depression, presents via EMS for evaluation of tremors and speech difficulty. Patient states that she was not feeling well this evening, specifically noting that she felt \"off balance\". She took her blood pressure and saw that it was elevated. Shortly afterwards, patient started having episodes of diffuse body tremors and difficulty speaking. Patient reports a history of similar symptoms in the past for which she has been evaluated by neurology. Had no evidence of seizure on EEG and was diagnosed with \"convulsive activity\". EMS was called as a result of patient having multiple episodes of this convulsive activity tonight. She states that she also had a similar episode this morning. Denies recent life stressors, and states that she has been taking her blood pressure medications as prescribed. There are no other acute medical concerns at this time. Social hx: Positive Tobacco use (current some day smoker); Positive EtOH use (occasional); Denies Illicit Drug Abuse PCP: Radha Solorzano MD 
Neurology: Patrick Amezquita MD 
 
Note written by Josy Sanchez, as dictated by Cory Sanchez MD 1:24 AM  
 
 
The history is provided by the patient, the EMS personnel and medical records. No  was used. Past Medical History:  
Diagnosis Date  Depression  Headache   
 HTN, goal below 140/90  Hypercholesterolemia Past Surgical History:  
Procedure Laterality Date [address] [facility], 2002, 2009  HX COLONOSCOPY  2002  
 approx date  HX LAPAROTOMY  2001 Family History:  
Problem Relation Age of Onset  Drug Abuse Mother   
     cocaine  Depression Mother  Hypertension Mother  Drug Abuse Father   
     heroin  Cancer Maternal Grandmother   
     esophageal cancer  Colon Cancer Maternal Grandfather   
     in his 62s  Prostate Cancer Maternal Grandfather  Cancer Paternal Grandmother   
     brain, not sure of primary  Prostate Cancer Paternal Grandfather  Lupus Maternal Aunt   
     2 aunts with lupus Social History Socioeconomic History  Marital status:  Spouse name: Not on file  Number of children: Not on file  Years of education: Not on file  Highest education level: Not on file Social Needs  Financial resource strain: Not on file  Food insecurity - worry: Not on file  Food insecurity - inability: Not on file  Transportation needs - medical: Not on file  Transportation needs - non-medical: Not on file Occupational History  Not on file Tobacco Use  Smoking status: Current Some Day Smoker  Smokeless tobacco: Never Used  Tobacco comment: cigar Substance and Sexual Activity  Alcohol use: Yes Comment: occasional  
 Drug use: No  
 Sexual activity: Yes Other Topics Concern  Not on file Social History Narrative  Not on file ALLERGIES: Propofol Review of Systems Neurological: Positive for tremors and speech difficulty. All other systems reviewed and are negative. Vitals:  
 01/05/19 0126 BP: (!) 190/111 Pulse: 69 Resp: 16 Temp: 98.2 °F (36.8 °C) SpO2: 97% Physical Exam  
Constitutional: She appears well-developed and well-nourished. No distress. Well-appearing. HENT:  
Head: Normocephalic and atraumatic. Eyes: Conjunctivae are normal.  
Neck: Neck supple. Cardiovascular: Normal rate and regular rhythm. Pulmonary/Chest: Effort normal. No respiratory distress. Abdominal: She exhibits no distension. Musculoskeletal: Normal range of motion. She exhibits no deformity. Neurological: She is alert. No cranial nerve deficit. Non-neurologic head movements with non neurologic slowing of speech. Normal syntax, speaking in full sentences, no aphasia. Skin: Skin is warm and dry. Psychiatric: Her behavior is normal.  
Nursing note and vitals reviewed. Note written by Pretty Dejesus. [employer], as dictated by Lauren Hammond MD 1:24 AM  
 
MDM 
  
57 y.o. female presents with feeling of speech difficulty and head tremor with noted chronic hypertension. Pt provided atarax with resolution of non-neurologic speech pattern and felt better. I suspect this is a stress response type reaction to he blood pressure readings and she has a well documented history of this. She needs slow correction of BP on an outpatient basis under the guidance of her PCP. Plan to follow up with PCP as needed and return precautions discussed for worsening or new concerning symptoms. Procedures

## 2019-01-05 NOTE — DISCHARGE INSTRUCTIONS
Patient Education        High Blood Pressure: Care Instructions  Your Care Instructions    If your blood pressure is usually above 130/80, you have high blood pressure, or hypertension. That means the top number is 130 or higher or the bottom number is 80 or higher, or both. Despite what a lot of people think, high blood pressure usually doesn't cause headaches or make you feel dizzy or lightheaded. It usually has no symptoms. But it does increase your risk for heart attack, stroke, and kidney or eye damage. The higher your blood pressure, the more your risk increases. Your doctor will give you a goal for your blood pressure. Your goal will be based on your health and your age. Lifestyle changes, such as eating healthy and being active, are always important to help lower blood pressure. You might also take medicine to reach your blood pressure goal.  Follow-up care is a key part of your treatment and safety. Be sure to make and go to all appointments, and call your doctor if you are having problems. It's also a good idea to know your test results and keep a list of the medicines you take. How can you care for yourself at home? Medical treatment  · If you stop taking your medicine, your blood pressure will go back up. You may take one or more types of medicine to lower your blood pressure. Be safe with medicines. Take your medicine exactly as prescribed. Call your doctor if you think you are having a problem with your medicine. · Talk to your doctor before you start taking aspirin every day. Aspirin can help certain people lower their risk of a heart attack or stroke. But taking aspirin isn't right for everyone, because it can cause serious bleeding. · See your doctor regularly. You may need to see the doctor more often at first or until your blood pressure comes down.   · If you are taking blood pressure medicine, talk to your doctor before you take decongestants or anti-inflammatory medicine, such as ibuprofen. Some of these medicines can raise blood pressure. · Learn how to check your blood pressure at home. Lifestyle changes  · Stay at a healthy weight. This is especially important if you put on weight around the waist. Losing even 10 pounds can help you lower your blood pressure. · If your doctor recommends it, get more exercise. Walking is a good choice. Bit by bit, increase the amount you walk every day. Try for at least 30 minutes on most days of the week. You also may want to swim, bike, or do other activities. · Avoid or limit alcohol. Talk to your doctor about whether you can drink any alcohol. · Try to limit how much sodium you eat to less than 2,300 milligrams (mg) a day. Your doctor may ask you to try to eat less than 1,500 mg a day. · Eat plenty of fruits (such as bananas and oranges), vegetables, legumes, whole grains, and low-fat dairy products. · Lower the amount of saturated fat in your diet. Saturated fat is found in animal products such as milk, cheese, and meat. Limiting these foods may help you lose weight and also lower your risk for heart disease. · Do not smoke. Smoking increases your risk for heart attack and stroke. If you need help quitting, talk to your doctor about stop-smoking programs and medicines. These can increase your chances of quitting for good. When should you call for help? Call 911 anytime you think you may need emergency care. This may mean having symptoms that suggest that your blood pressure is causing a serious heart or blood vessel problem. Your blood pressure may be over 180/120.   For example, call 911 if:    · You have symptoms of a heart attack. These may include:  ? Chest pain or pressure, or a strange feeling in the chest.  ? Sweating. ? Shortness of breath. ? Nausea or vomiting. ? Pain, pressure, or a strange feeling in the back, neck, jaw, or upper belly or in one or both shoulders or arms. ? Lightheadedness or sudden weakness.   ? A fast or irregular heartbeat.     · You have symptoms of a stroke. These may include:  ? Sudden numbness, tingling, weakness, or loss of movement in your face, arm, or leg, especially on only one side of your body. ? Sudden vision changes. ? Sudden trouble speaking. ? Sudden confusion or trouble understanding simple statements. ? Sudden problems with walking or balance. ? A sudden, severe headache that is different from past headaches.     · You have severe back or belly pain.    Do not wait until your blood pressure comes down on its own. Get help right away.   Call your doctor now or seek immediate care if:    · Your blood pressure is much higher than normal (such as 180/120 or higher), but you don't have symptoms.     · You think high blood pressure is causing symptoms, such as:  ? Severe headache.  ? Blurry vision.    Watch closely for changes in your health, and be sure to contact your doctor if:    · Your blood pressure measures higher than your doctor recommends at least 2 times. That means the top number is higher or the bottom number is higher, or both.     · You think you may be having side effects from your blood pressure medicine. Where can you learn more? Go to http://darcie-jessi.info/. Enter R509 in the search box to learn more about \"High Blood Pressure: Care Instructions. \"  Current as of: December 6, 2017  Content Version: 11.8  © 1771-5997 [employer]. Care instructions adapted under license by [employer] (which disclaims liability or warranty for this information). If you have questions about a medical condition or this instruction, always ask your healthcare professional. [place] [address] any warranty or liability for your use of this information.

## 2019-01-05 NOTE — ED TRIAGE NOTES
PT arrives via EMS d/t patient having \"17 seizures in 25 minutes\". Pt has tremors that can stop on command if you tell her to. PT also has expressive aphasia. Hx of HTN and pt is followed by neurology.

## 2019-01-05 NOTE — LETTER
[address] 
[address] [facility] [address] 
[phone] Work/School Note Date: 1/5/2019 To Whom It May concern: 
 
Janie Opitz was seen and treated today in the emergency room by the following provider(s): 
No providers found. Janie Opitz may return to work on 1/7/19. Sincerely, Nola Calles RN

## 2019-01-09 ENCOUNTER — OFFICE VISIT (OUTPATIENT)
Dept: FAMILY MEDICINE CLINIC | Age: 49
End: 2019-01-09

## 2019-01-09 VITALS
RESPIRATION RATE: 18 BRPM | OXYGEN SATURATION: 97 % | SYSTOLIC BLOOD PRESSURE: 138 MMHG | DIASTOLIC BLOOD PRESSURE: 88 MMHG | WEIGHT: 198 LBS | BODY MASS INDEX: 31.08 KG/M2 | HEIGHT: 67 IN | TEMPERATURE: 98.1 F | HEART RATE: 64 BPM

## 2019-01-09 DIAGNOSIS — R31.29 MICROSCOPIC HEMATURIA: ICD-10-CM

## 2019-01-09 DIAGNOSIS — R35.0 FREQUENT URINATION: Primary | ICD-10-CM

## 2019-01-09 DIAGNOSIS — R10.819 SUPRAPUBIC TENDERNESS: ICD-10-CM

## 2019-01-09 DIAGNOSIS — R39.9 UTI SYMPTOMS: ICD-10-CM

## 2019-01-09 DIAGNOSIS — N39.3 STRESS INCONTINENCE: ICD-10-CM

## 2019-01-09 LAB
BILIRUB UR QL STRIP: NEGATIVE
GLUCOSE UR-MCNC: NEGATIVE MG/DL
KETONES P FAST UR STRIP-MCNC: NORMAL MG/DL
PH UR STRIP: 5.5 [PH] (ref 4.6–8)
PROT UR QL STRIP: NORMAL
SP GR UR STRIP: 1.03 (ref 1–1.03)
UA UROBILINOGEN AMB POC: NORMAL (ref 0.2–1)
URINALYSIS CLARITY POC: CLEAR
URINALYSIS COLOR POC: YELLOW
URINE BLOOD POC: NORMAL
URINE LEUKOCYTES POC: NEGATIVE
URINE NITRITES POC: NEGATIVE

## 2019-01-09 RX ORDER — NITROFURANTOIN 25; 75 MG/1; MG/1
100 CAPSULE ORAL 2 TIMES DAILY
Qty: 10 CAP | Refills: 0 | Status: SHIPPED | OUTPATIENT
Start: 2019-01-09 | End: 2019-01-14

## 2019-01-09 NOTE — PROGRESS NOTES
[facility] [facility] Note      Subjective:     Chief Complaint   Patient presents with    Flank Pain     x 1 week    Urinary Frequency     Santhosh Dale is a 57y.o. year old female who presents for evaluation of the following:    Urinary Issue/ Flank Pain  Frequent urination, incomplete bladder emptying  chronic leakage with coughing  Denies dysuria, vaginal, hematuria. Has had 2 sexual partners in past 6 months        Review of Systems   Pertinent positives and negative per HPI. All other systems  reviewed are negative for a Comprehensive ROS (10+). Past Medical History:   Diagnosis Date    Depression     Headache     HTN, goal below 140/90     Hypercholesterolemia         Social History     Socioeconomic History    Marital status:      Spouse name: Not on file    Number of children: Not on file    Years of education: Not on file    Highest education level: Not on file   Social Needs    Financial resource strain: Not on file    Food insecurity - worry: Not on file    Food insecurity - inability: Not on file    Transportation needs - medical: Not on file   [employer] needs - non-medical: Not on file   Occupational History    Not on file   Tobacco Use    Smoking status: Current Some Day Smoker    Smokeless tobacco: Never Used    Tobacco comment: cigar   Substance and Sexual Activity    Alcohol use: Yes     Comment: occasional    Drug use: No    Sexual activity: Yes   Other Topics Concern    Not on file   Social History Narrative    Not on file       Current Outpatient Medications   Medication Sig    amLODIPine (NORVASC) 10 mg tablet TAKE 1 TAB BY MOUTH DAILY.  diclofenac (VOLTAREN) 1 % gel APPLY 2 GRAMS TO AFFECTED AREA 4 TIMES A DAY    naproxen sodium (ALEVE) 220 mg tablet Take 220 mg by mouth two (2) times daily (with meals).  losartan (COZAAR) 25 mg tablet TAKE 1 TAB BY MOUTH DAILY.     citalopram (CELEXA) 20 mg tablet TAKE 1 TABLET BY MOUTH DAILY    cholecalciferol, vitamin D3, (VITAMIN D3) 2,000 unit tab Take 2,000 Units by mouth daily.  hydrOXYzine HCl (ATARAX) 50 mg tablet Take 1 Tab by mouth every six (6) hours as needed for Itching for up to 10 days.  cholecalciferol (VITAMIN D3) 50,000 unit capsule Take 1 capsule once a week for the next 8 weeks then switch to OTC vitamin D3 2000 units daily     No current facility-administered medications for this visit. Objective:     Vitals:    01/09/19 1731   BP: 138/88   Pulse: 64   Resp: 18   Temp: 98.1 °F (36.7 °C)   TempSrc: Oral   SpO2: 97%   Weight: 198 lb (89.8 kg)   Height: 5' 7\" (1.702 m)       Physical Examination:  General: Alert, cooperative, no distress, appears stated age. Eyes: Conjunctivae clear. PERRL, EOMs intact. Ears: Normal external ear canals both ears  Nose: Nares normal.   Mouth/Throat: Lips, mucosa, and tongue normal.   Neck: Supple, symmetrical, trachea midline, no adenopathy. No thyroid enlargement/tenderness/nodules  Back: Symmetric, no curvature. ROM normal. No CVA tenderness. Lungs: Clear to auscultation bilaterally. Normal inspiratory and expiratory ratio. Heart: Regular rate and rhythm, S1, S2 normal, no murmur, click, rub or gallop. Abdomen: Soft, suprapubic tenderness. Bowel sounds normal. No masses or organomegaly. Extremities: Extremities normal, atraumatic, no cyanosis or edema. Skin: Skin color, texture, turgor normal. No rashes or lesions on exposed skin. Lymph nodes: Cervical, supraclavicular nodes normal.  Neurologic: CNII-XII intact. No visits with results within 3 Month(s) from this visit.    Latest known visit with results is:   Office Visit on 08/24/2018   Component Date Value Ref Range Status    Hemoglobin A1c 08/24/2018 5.7* 4.8 - 5.6 % Final    Comment:          Prediabetes: 5.7 - 6.4           Diabetes: >6.4           Glycemic control for adults with diabetes: <7.0      Estimated average glucose 08/24/2018 117  mg/dL Final  Glucose 08/24/2018 92  65 - 99 mg/dL Final    BUN 08/24/2018 12  6 - 24 mg/dL Final    Creatinine 08/24/2018 0.81  0.57 - 1.00 mg/dL Final    GFR est non-AA 08/24/2018 87  >59 mL/min/1.73 Final    GFR est AA 08/24/2018 101  >59 mL/min/1.73 Final    BUN/Creatinine ratio 08/24/2018 15  9 - 23 Final    Sodium 08/24/2018 140  134 - 144 mmol/L Final    Potassium 08/24/2018 4.1  3.5 - 5.2 mmol/L Final    Chloride 08/24/2018 107* 96 - 106 mmol/L Final    CO2 08/24/2018 19* 20 - 29 mmol/L Final    Calcium 08/24/2018 9.4  8.7 - 10.2 mg/dL Final    Protein, total 08/24/2018 7.1  6.0 - 8.5 g/dL Final    Albumin 08/24/2018 4.2  3.5 - 5.5 g/dL Final    GLOBULIN, TOTAL 08/24/2018 2.9  1.5 - 4.5 g/dL Final    A-G Ratio 08/24/2018 1.4  1.2 - 2.2 Final    Bilirubin, total 08/24/2018 0.4  0.0 - 1.2 mg/dL Final    Alk. phosphatase 08/24/2018 70  39 - 117 IU/L Final    AST (SGOT) 08/24/2018 14  0 - 40 IU/L Final    ALT (SGPT) 08/24/2018 13  0 - 32 IU/L Final    TSH 08/24/2018 2.520  0.450 - 4.500 uIU/mL Final    T4, Free 08/24/2018 1.32  0.82 - 1.77 ng/dL Final    VITAMIN D, 25-HYDROXY 08/24/2018 23.9* 30.0 - 100.0 ng/mL Final    Comment: Vitamin D deficiency has been defined by the [address] [facility] practice guideline as a  level of serum 25-OH vitamin D less than 20 ng/mL (1,2). The Endocrine Society went on to further define vitamin D  insufficiency as a level between 21 and 29 ng/mL (2). 1. IOM ([place] of [employer]). 2010. Dietary reference     intakes for calcium and D. [facility]: The     [employer]. 2. Declan MF, Ollie LOPEZ, Hunter YOUNG, et al.     Evaluation, treatment, and prevention of vitamin D     deficiency: an Endocrine Society clinical practice     guideline. JCEM. 2011 Jul; 96(7):1911-30.       Cholesterol, total 08/24/2018 189  100 - 199 mg/dL Final    Triglyceride 08/24/2018 65  0 - 149 mg/dL Final    HDL Cholesterol 08/24/2018 62 >39 mg/dL Final    VLDL, calculated 08/24/2018 13  5 - 40 mg/dL Final    LDL, calculated 08/24/2018 114* 0 - 99 mg/dL Final    WBC 08/24/2018 5.1  3.4 - 10.8 x10E3/uL Final    RBC 08/24/2018 4.38  3.77 - 5.28 x10E6/uL Final    HGB 08/24/2018 12.6  11.1 - 15.9 g/dL Final    HCT 08/24/2018 38.6  34.0 - 46.6 % Final    MCV 08/24/2018 88  79 - 97 fL Final    MCH 08/24/2018 28.8  26.6 - 33.0 pg Final    MCHC 08/24/2018 32.6  31.5 - 35.7 g/dL Final    RDW 08/24/2018 14.0  12.3 - 15.4 % Final    PLATELET 50/55/9438 561  150 - 379 x10E3/uL Final    NEUTROPHILS 08/24/2018 45  Not Estab. % Final    Lymphocytes 08/24/2018 44  Not Estab. % Final    MONOCYTES 08/24/2018 8  Not Estab. % Final    EOSINOPHILS 08/24/2018 3  Not Estab. % Final    BASOPHILS 08/24/2018 0  Not Estab. % Final    ABS. NEUTROPHILS 08/24/2018 2.3  1.4 - 7.0 x10E3/uL Final    Abs Lymphocytes 08/24/2018 2.3  0.7 - 3.1 x10E3/uL Final    ABS. MONOCYTES 08/24/2018 0.4  0.1 - 0.9 x10E3/uL Final    ABS. EOSINOPHILS 08/24/2018 0.1  0.0 - 0.4 x10E3/uL Final    ABS. BASOPHILS 08/24/2018 0.0  0.0 - 0.2 x10E3/uL Final    IMMATURE GRANULOCYTES 08/24/2018 0  Not Estab. % Final    ABS. IMM. GRANS. 08/24/2018 0.0  0.0 - 0.1 x10E3/uL Final    HIV SCREEN 4TH GENERATION WRFX 08/24/2018 Non Reactive  Non Reactive Final    Chlamydia trachomatis, MYRIAM 08/24/2018 Negative  Negative Final    Neisseria gonorrhoeae, MYRIAM 08/24/2018 Negative  Negative Final    HCV Ab 08/24/2018 <0.1  0.0 - 0.9 s/co ratio Final    RPR 08/24/2018 Non Reactive  Non Reactive Final    HCV Interpretation 08/24/2018 Comment   Final    Comment: Negative  Not infected with HCV, unless recent infection is suspected or other  evidence exists to indicate HCV infection.  INTERPRETATION 08/24/2018 Note   Final    Supplemental report is available. Assessment/ Plan:   Diagnoses and all orders for this visit:    1.  Frequent urination  -     AMB POC URINALYSIS DIP STICK AUTO W/O MICRO  -     CT/NG/T.VAGINALIS AMPLIFICATION  -     CULTURE, URINE  -     URINALYSIS W/ RFLX MICROSCOPIC  -     nitrofurantoin, macrocrystal-monohydrate, (MACROBID) 100 mg capsule; Take 1 Cap by mouth two (2) times a day for 5 days. 2. Suprapubic tenderness  -     AMB POC URINALYSIS DIP STICK AUTO W/O MICRO    3. UTI symptoms  -     CT/NG/T.VAGINALIS AMPLIFICATION  -     CULTURE, URINE  -     URINALYSIS W/ RFLX MICROSCOPIC  -     nitrofurantoin, macrocrystal-monohydrate, (MACROBID) 100 mg capsule; Take 1 Cap by mouth two (2) times a day for 5 days. 4. Microscopic hematuria  -     CT/NG/T.VAGINALIS AMPLIFICATION  -     CULTURE, URINE  -     URINALYSIS W/ RFLX MICROSCOPIC  -     nitrofurantoin, macrocrystal-monohydrate, (MACROBID) 100 mg capsule; Take 1 Cap by mouth two (2) times a day for 5 days. 5. Stress incontinence    Other orders  -     URINALYSIS W/ RFLX MICROSCOPIC  -     MICROSCOPIC EXAMINATION      UTI symptoms with negative urine dip. Suspect stress incontinence vs infection as cause of symptoms. Will send abx for empiric treatment urine culture and STI testing to evaluate for infection. - start bladder training and kegels. Educated patient on red flag symptoms to warrant return to clinic or emergency room visit. I have discussed the diagnosis with the patient and the intended plan as seen in the above orders. The patient has been offered or received an after-visit summary and questions were answered concerning future plans. I have discussed medication side effects and warnings with the patient as well. Follow-up Disposition:  Return if symptoms worsen or fail to improve.       Signed,    Venkat Biswas MD  1/9/2019

## 2019-01-09 NOTE — PATIENT INSTRUCTIONS
Bladder Training: Care Instructions  Your Care Instructions    Bladder training is used to treat urge incontinence and stress incontinence. Urge incontinence means that the need to urinate comes on so fast that you can't get to a toilet in time. Stress incontinence means that you leak urine because of pressure on your bladder. For example, it may happen when you laugh, cough, or lift something heavy. Bladder training can increase how long you can wait before you have to urinate. It can also help your bladder hold more urine. And it can give you better control over the urge to urinate. It is important to remember that bladder training takes a few weeks to a few months to make a difference. You may not see results right away, but don't give up. Follow-up care is a key part of your treatment and safety. Be sure to make and go to all appointments, and call your doctor if you are having problems. It's also a good idea to know your test results and keep a list of the medicines you take. How can you care for yourself at home? Work with your doctor to come up with a bladder training program that is right for you. You may use one or more of the following methods. Delayed urination  · In the beginning, try to keep from urinating for 5 minutes after you first feel the need to go. · While you wait, take deep, slow breaths to relax. Kegel exercises can also help you delay the need to go to the bathroom. · After some practice, when you can easily wait 5 minutes to urinate, try to wait 10 minutes before you urinate. · Slowly increase the waiting period until you are able to control when you have to urinate. Scheduled urination  · Empty your bladder when you first wake up in the morning. · Schedule times throughout the day when you will urinate. · Start by going to the bathroom every hour, even if you don't need to go. · Slowly increase the time between trips to the bathroom.   · When you have found a schedule that works well for you, keep doing it. · If you wake up during the night and have to urinate, do it. Apply your schedule to waking hours only. Kegel exercises  These tighten and strengthen pelvic muscles, which can help you control the flow of urine. To do Kegel exercises:  · Squeeze the same muscles you would use to stop your urine. Your belly and thighs should not move. · Hold the squeeze for 3 seconds, and then relax for 3 seconds. · Start with 3 seconds. Then add 1 second each week until you are able to squeeze for 10 seconds. · Repeat the exercise 10 to 15 times a session. Do three or more sessions a day. When should you call for help? Watch closely for changes in your health, and be sure to contact your doctor if:    · Your incontinence is getting worse.     · You do not get better as expected. Where can you learn more? Go to http://darcie-jessi.info/. Enter W118 in the search box to learn more about \"Bladder Training: Care Instructions. \"  Current as of: March 21, 2018  Content Version: 11.8  © 1137-7024 [employer]. Care instructions adapted under license by [employer] (which disclaims liability or warranty for this information). If you have questions about a medical condition or this instruction, always ask your healthcare professional. [address] any warranty or liability for your use of this information. Kegel Exercises: Care Instructions  Your Care Instructions    Kegel exercises strengthen muscles around the bladder. These muscles control the flow of urine. Kegel exercises are sometime called \"pelvic floor\" exercises. They can help prevent urine leakage and keep the pelvic organs in place. A woman who just had a baby might want to try Kegel exercises. They can strengthen pelvic muscles that have been weakened by pregnancy and childbirth. A man or woman may use Kegel exercises to treat urine leakage.   You do Kegel exercises by tightening the muscles you use when you urinate. You will likely need to do these exercises for several weeks to get better. Follow-up care is a key part of your treatment and safety. Be sure to make and go to all appointments, and call your doctor if you are having problems. It's also a good idea to know your test results and keep a list of the medicines you take. How can you care for yourself at home? · Do Kegel exercises. ? Find the muscles you need to strengthen. To do this, tighten the muscles that stop your urine while you are going to the bathroom. These are the same muscles you squeeze during Kegel exercises. ? Squeeze the muscles as hard as you can. Your belly and thighs should not move. ? Hold the squeeze for 3 seconds. Then relax for 3 seconds. ? Start with 3 seconds, and then add 1 second each week until you are able to squeeze for 10 seconds. ? Repeat the exercise 10 to 15 times for each session. Do three or more sessions each day. · You can check to see if you are using the right muscles. Place a finger in your vagina and squeeze around it. You are doing them right when you feel pressure around your finger. Your doctor may also suggest that you put special weights in your vagina while you do the exercises. · Do not smoke. It can irritate the bladder. If you need help quitting, talk to your doctor about stop-smoking programs and medicines. These can increase your chances of quitting for good. Where can you learn more? Go to http://darcie-jessi.info/. Enter L927 in the search box to learn more about \"Kegel Exercises: Care Instructions. \"  Current as of: March 21, 2018  Content Version: 11.8  © 8392-4747 [employer]. Care instructions adapted under license by [employer] (which disclaims liability or warranty for this information).  If you have questions about a medical condition or this instruction, always ask your healthcare professional. [address] any warranty or liability for your use of this information.

## 2019-01-09 NOTE — PROGRESS NOTES
Chief Complaint   Patient presents with    Flank Pain     x 1 week    Urinary Frequency     1. Have you been to the ER, urgent care clinic since your last visit? Hospitalized since your last visit? Patient was seen at [facility] for high blood pressure on 01/04 . 2. Have you seen or consulted any other health care providers outside of the [address] [place] [address] since your last visit? Include any pap smears or colon screening.  No

## 2019-01-10 LAB
APPEARANCE UR: CLEAR
BACTERIA #/AREA URNS HPF: ABNORMAL /[HPF]
BILIRUB UR QL STRIP: NEGATIVE
CASTS URNS QL MICRO: ABNORMAL /LPF
COLOR UR: YELLOW
EPI CELLS #/AREA URNS HPF: >10 /HPF
GLUCOSE UR QL: NEGATIVE
HGB UR QL STRIP: ABNORMAL
KETONES UR QL STRIP: ABNORMAL
LEUKOCYTE ESTERASE UR QL STRIP: NEGATIVE
MICRO URNS: ABNORMAL
MUCOUS THREADS URNS QL MICRO: PRESENT
NITRITE UR QL STRIP: NEGATIVE
PH UR STRIP: 5 [PH] (ref 5–7.5)
PROT UR QL STRIP: NEGATIVE
RBC #/AREA URNS HPF: ABNORMAL /HPF
SP GR UR: >=1.03 (ref 1–1.03)
UROBILINOGEN UR STRIP-MCNC: 0.2 MG/DL (ref 0.2–1)
WBC #/AREA URNS HPF: ABNORMAL /HPF

## 2019-01-11 LAB — BACTERIA UR CULT: NORMAL

## 2019-01-13 NOTE — PROGRESS NOTES
Notify Patient:  Your urine test was negative for urinary tract infection but suggest dehydration. Other tests are still pending.

## 2019-01-14 LAB
C TRACH RRNA SPEC QL NAA+PROBE: NEGATIVE
GLUCOSE UR QL: NORMAL
KETONES UR QL STRIP: NORMAL
N GONORRHOEA RRNA SPEC QL NAA+PROBE: NEGATIVE
PH UR STRIP: NORMAL [PH]
PROT UR QL STRIP: NORMAL
SP GR UR: NORMAL
T VAGINALIS RRNA VAG QL NAA+PROBE: NEGATIVE

## 2019-01-20 NOTE — PROGRESS NOTES
Notify Patient:  Urine testing for sexually transmitted infection was negative including testing for chlamydia, gonorrhea, trichomonas.

## 2019-01-21 NOTE — PROGRESS NOTES
Unable to reach patient via telephone. Phone continues to ring busy. Letter printed with normal results.

## 2019-05-03 ENCOUNTER — OFFICE VISIT (OUTPATIENT)
Dept: FAMILY MEDICINE CLINIC | Age: 49
End: 2019-05-03

## 2019-05-03 VITALS
DIASTOLIC BLOOD PRESSURE: 86 MMHG | BODY MASS INDEX: 31.8 KG/M2 | RESPIRATION RATE: 18 BRPM | HEART RATE: 71 BPM | OXYGEN SATURATION: 98 % | HEIGHT: 67 IN | WEIGHT: 202.6 LBS | SYSTOLIC BLOOD PRESSURE: 138 MMHG | TEMPERATURE: 98 F

## 2019-05-03 DIAGNOSIS — I10 HTN, GOAL BELOW 140/90: ICD-10-CM

## 2019-05-03 DIAGNOSIS — M79.642 PAIN IN BOTH HANDS: ICD-10-CM

## 2019-05-03 DIAGNOSIS — Z11.3 SCREENING EXAMINATION FOR STD (SEXUALLY TRANSMITTED DISEASE): ICD-10-CM

## 2019-05-03 DIAGNOSIS — M79.641 PAIN IN BOTH HANDS: ICD-10-CM

## 2019-05-03 DIAGNOSIS — N89.8 VAGINAL ITCHING: ICD-10-CM

## 2019-05-03 DIAGNOSIS — F41.9 ANXIETY AND DEPRESSION: ICD-10-CM

## 2019-05-03 DIAGNOSIS — R35.0 FREQUENT URINATION: Primary | ICD-10-CM

## 2019-05-03 DIAGNOSIS — F32.A ANXIETY AND DEPRESSION: ICD-10-CM

## 2019-05-03 LAB
BILIRUB UR QL STRIP: NEGATIVE
GLUCOSE UR-MCNC: NEGATIVE MG/DL
KETONES P FAST UR STRIP-MCNC: NEGATIVE MG/DL
PH UR STRIP: 6 [PH] (ref 4.6–8)
PROT UR QL STRIP: NORMAL
SP GR UR STRIP: 1.03 (ref 1–1.03)
UA UROBILINOGEN AMB POC: NORMAL (ref 0.2–1)
URINALYSIS CLARITY POC: CLEAR
URINALYSIS COLOR POC: YELLOW
URINE BLOOD POC: NORMAL
URINE LEUKOCYTES POC: NEGATIVE
URINE NITRITES POC: NEGATIVE

## 2019-05-03 RX ORDER — SERTRALINE HYDROCHLORIDE 25 MG/1
25 TABLET, FILM COATED ORAL DAILY
Qty: 30 TAB | Refills: 2 | Status: SHIPPED | OUTPATIENT
Start: 2019-05-03 | End: 2019-05-28 | Stop reason: SDUPTHER

## 2019-05-03 RX ORDER — DICLOFENAC SODIUM 10 MG/G
GEL TOPICAL
Qty: 100 G | Refills: 0 | Status: SHIPPED | OUTPATIENT
Start: 2019-05-03

## 2019-05-03 NOTE — PATIENT INSTRUCTIONS
DASH Diet: Care Instructions  Your Care Instructions    The DASH diet is an eating plan that can help lower your blood pressure. DASH stands for Dietary Approaches to Stop Hypertension. Hypertension is high blood pressure. The DASH diet focuses on eating foods that are high in calcium, potassium, and magnesium. These nutrients can lower blood pressure. The foods that are highest in these nutrients are fruits, vegetables, low-fat dairy products, nuts, seeds, and legumes. But taking calcium, potassium, and magnesium supplements instead of eating foods that are high in those nutrients does not have the same effect. The DASH diet also includes whole grains, fish, and poultry. The DASH diet is one of several lifestyle changes your doctor may recommend to lower your high blood pressure. Your doctor may also want you to decrease the amount of sodium in your diet. Lowering sodium while following the DASH diet can lower blood pressure even further than just the DASH diet alone. Follow-up care is a key part of your treatment and safety. Be sure to make and go to all appointments, and call your doctor if you are having problems. It's also a good idea to know your test results and keep a list of the medicines you take. How can you care for yourself at home? Following the DASH diet  · Eat 4 to 5 servings of fruit each day. A serving is 1 medium-sized piece of fruit, ½ cup chopped or canned fruit, 1/4 cup dried fruit, or 4 ounces (½ cup) of fruit juice. Choose fruit more often than fruit juice. · Eat 4 to 5 servings of vegetables each day. A serving is 1 cup of lettuce or raw leafy vegetables, ½ cup of chopped or cooked vegetables, or 4 ounces (½ cup) of vegetable juice. Choose vegetables more often than vegetable juice. · Get 2 to 3 servings of low-fat and fat-free dairy each day. A serving is 8 ounces of milk, 1 cup of yogurt, or 1 ½ ounces of cheese. · Eat 6 to 8 servings of grains each day.  A serving is 1 slice of bread, 1 ounce of dry cereal, or ½ cup of cooked rice, pasta, or cooked cereal. Try to choose whole-grain products as much as possible. · Limit lean meat, poultry, and fish to 2 servings each day. A serving is 3 ounces, about the size of a deck of cards. · Eat 4 to 5 servings of nuts, seeds, and legumes (cooked dried beans, lentils, and split peas) each week. A serving is 1/3 cup of nuts, 2 tablespoons of seeds, or ½ cup of cooked beans or peas. · Limit fats and oils to 2 to 3 servings each day. A serving is 1 teaspoon of vegetable oil or 2 tablespoons of salad dressing. · Limit sweets and added sugars to 5 servings or less a week. A serving is 1 tablespoon jelly or jam, ½ cup sorbet, or 1 cup of lemonade. · Eat less than 2,300 milligrams (mg) of sodium a day. If you limit your sodium to 1,500 mg a day, you can lower your blood pressure even more. Tips for success  · Start small. Do not try to make dramatic changes to your diet all at once. You might feel that you are missing out on your favorite foods and then be more likely to not follow the plan. Make small changes, and stick with them. Once those changes become habit, add a few more changes. · Try some of the following:  ? Make it a goal to eat a fruit or vegetable at every meal and at snacks. This will make it easy to get the recommended amount of fruits and vegetables each day. ? Try yogurt topped with fruit and nuts for a snack or healthy dessert. ? Add lettuce, tomato, cucumber, and onion to sandwiches. ? Combine a ready-made pizza crust with low-fat mozzarella cheese and lots of vegetable toppings. Try using tomatoes, squash, spinach, broccoli, carrots, cauliflower, and onions. ? Have a variety of cut-up vegetables with a low-fat dip as an appetizer instead of chips and dip. ? Sprinkle sunflower seeds or chopped almonds over salads. Or try adding chopped walnuts or almonds to cooked vegetables.   ? Try some vegetarian meals using beans and peas. Add garbanzo or kidney beans to salads. Make burritos and tacos with mashed koch beans or black beans. Where can you learn more? Go to http://darcie-jessi.info/. Enter K218 in the search box to learn more about \"DASH Diet: Care Instructions. \"  Current as of: July 22, 2018  Content Version: 11.9  © 9946-0624 [employer]. Care instructions adapted under license by [employer] (which disclaims liability or warranty for this information). If you have questions about a medical condition or this instruction, always ask your healthcare professional. [address] any warranty or liability for your use of this information.

## 2019-05-03 NOTE — PROGRESS NOTES
Patient Name: Dar Ritchie   MRN: [id number]    Felisa8 Dakota Bauer is a 57 y.o. female who presents with the following:     Reports 2-week history of urinary frequency without burning. Also had some abnormal vaginal odor. Has a history of recurrent bacterial vaginosis. States that it is occasionally uncomfortable after sex. Would like full STD testing today. History of hypertension, currently on blood pressure medications. She is currently followed by neurology as she has a history of convulsive activity that seems to be triggered with anxiety or fatigue. States that her anxiety has been uncontrolled recently. Stopped her Celexa about 2 months ago as it was making her too drowsy. Has an upcoming appointment with a counselor next week. Previously did well on Wellbutrin. Review of Systems   Constitutional: Negative for fever, malaise/fatigue and weight loss. Respiratory: Negative for cough, hemoptysis, shortness of breath and wheezing. Cardiovascular: Negative for chest pain, palpitations, leg swelling and PND. Gastrointestinal: Negative for abdominal pain, constipation, diarrhea, nausea and vomiting. Genitourinary: Positive for frequency. Negative for dysuria, flank pain, hematuria and urgency. Psychiatric/Behavioral: Positive for depression. Negative for suicidal ideas. The patient is nervous/anxious. The patient's medications, allergies, past medical history, surgical history, family history and social history were reviewed and updated where appropriate. Prior to Admission medications    Medication Sig Start Date End Date Taking? Authorizing Provider   citalopram (CELEXA) 20 mg tablet TAKE 1 TABLET BY MOUTH DAILY 4/2/19  Yes Sabine Angel MD   amLODIPine (NORVASC) 10 mg tablet TAKE 1 TAB BY MOUTH DAILY.  1/4/19  Yes John Ortega MD   diclofenac (VOLTAREN) 1 % gel APPLY 2 GRAMS TO AFFECTED AREA 4 TIMES A DAY 10/25/18  Yes John Ortega MD cholecalciferol (VITAMIN D3) 50,000 unit capsule Take 1 capsule once a week for the next 8 weeks then switch to OTC vitamin D3 2000 units daily 8/27/18  Yes Mariposa Kelly MD   naproxen sodium (ALEVE) 220 mg tablet Take 220 mg by mouth two (2) times daily (with meals). Yes Provider, Historical   losartan (COZAAR) 25 mg tablet TAKE 1 TAB BY MOUTH DAILY. 8/24/18  Yes Mariposa Kelly MD   cholecalciferol, vitamin D3, (VITAMIN D3) 2,000 unit tab Take 2,000 Units by mouth daily. Yes Provider, Historical       Allergies   Allergen Reactions    Propofol Other (comments)     Patient states she has a difficult time waking up           OBJECTIVE    Visit Vitals  /86 (BP 1 Location: Left arm, BP Patient Position: Sitting)   Pulse 71   Temp 98 °F (36.7 °C) (Oral)   Resp 18   Ht 5' 7\" (1.702 m)   Wt 202 lb 9.6 oz (91.9 kg)   LMP  (LMP Unknown)   SpO2 98%   BMI 31.73 kg/m²       Physical Exam   Constitutional: She is oriented to person, place, and time and well-developed, well-nourished, and in no distress. No distress. Eyes: Pupils are equal, round, and reactive to light. Conjunctivae and EOM are normal.   Musculoskeletal: Normal range of motion. Neurological: She is alert and oriented to person, place, and time. Gait normal.   Skin: Skin is warm and dry. She is not diaphoretic. Psychiatric: Mood, memory, affect and judgment normal.   Nursing note and vitals reviewed. Pelvic exam: VULVA: normal appearing vulva with no masses, tenderness or lesions, Nuswab collected. ASSESSMENT AND PLAN  Jessica Dunn is a 57 y.o. female who presents today for:    1. HTN, goal below 140/90  Stable, continue current treatment. 2. Frequent urination  R/o UTI.  - CULTURE, URINE  - AMB POC URINALYSIS DIP STICK MANUAL W/O MICRO    3. Vaginal itching  - NUSWAB VAGINITIS PLUS    4. Pain in both hands  - diclofenac (VOLTAREN) 1 % gel; APPLY 2 GRAMS TO AFFECTED AREA 4 TIMES A DAY  Dispense: 100 g; Refill: 0    5. Anxiety and depression  Start Zoloft and titrate prn. Avoid Wellbutrin given questionable seizures  - sertraline (ZOLOFT) 25 mg tablet; Take 1 Tab by mouth daily. Dispense: 30 Tab; Refill: 2    6. Screening examination for STD (sexually transmitted disease)  - NUSWAB VAGINITIS PLUS  - HIV 1/2 AG/AB, 4TH GENERATION,W RFLX CONFIRM  - RPR  - HEPATITIS PANEL, ACUTE       Medications Discontinued During This Encounter   Medication Reason    diclofenac (VOLTAREN) 1 % gel Reorder    citalopram (CELEXA) 20 mg tablet Not A Current Medication       Follow-up and Dispositions    · Return in about 6 weeks (around 6/14/2019) for Medication Check. Medication risks/benefits/costs/interactions/alternatives discussed with patient. Advised patient to call back or return to office if symptoms worsen/change/persist. If patient cannot reach us or should anything more severe/urgent arise he/she should proceed directly to the nearest emergency department. Discussed expected course/resolution/complications of diagnosis in detail with patient. Patient given a written after visit summary which includes his/her diagnoses, current medications and vitals. Patient expressed understanding with the diagnosis and plan. Rico Iqbal M.D.

## 2019-05-03 NOTE — PROGRESS NOTES
Chief Complaint   Patient presents with    Urinary Frequency     x2 weeks          1. Have you been to the ER, urgent care clinic since your last visit? Hospitalized since your last visit? No    2. Have you seen or consulted any other health care providers outside of the [address] [place] [address] since your last visit? Include any pap smears or colon screening.  No

## 2019-05-06 LAB
A VAGINAE DNA VAG QL NAA+PROBE: ABNORMAL SCORE
BACTERIA UR CULT: NO GROWTH
BVAB2 DNA VAG QL NAA+PROBE: ABNORMAL SCORE
C ALBICANS DNA VAG QL NAA+PROBE: POSITIVE
C GLABRATA DNA VAG QL NAA+PROBE: NEGATIVE
C TRACH RRNA SPEC QL NAA+PROBE: NEGATIVE
MEGA1 DNA VAG QL NAA+PROBE: ABNORMAL SCORE
N GONORRHOEA RRNA SPEC QL NAA+PROBE: NEGATIVE
T VAGINALIS RRNA SPEC QL NAA+PROBE: NEGATIVE

## 2019-05-07 RX ORDER — METRONIDAZOLE 500 MG/1
500 TABLET ORAL 2 TIMES DAILY
Qty: 14 TAB | Refills: 0 | Status: SHIPPED | OUTPATIENT
Start: 2019-05-07 | End: 2019-05-14

## 2019-05-07 RX ORDER — FLUCONAZOLE 150 MG/1
150 TABLET ORAL ONCE
Qty: 1 TAB | Refills: 0 | Status: SHIPPED | OUTPATIENT
Start: 2019-05-07 | End: 2019-05-07

## 2019-05-07 NOTE — PROGRESS NOTES
Please notify patient regarding their test results:    Positive for bacterial vaginosis and yeast infection; Rx for 7 day course of Flagyl sent plus one time dose of oral diflucan. No UTI. Pt did not complete blood work.

## 2019-05-07 NOTE — PROGRESS NOTES
Called patient, NORM verified. Informed patient of lab results and recommendations. Patient stated that pharmacy has contacted her regarding medications, but patient has not picked it up yet.

## 2019-05-28 DIAGNOSIS — F32.A ANXIETY AND DEPRESSION: ICD-10-CM

## 2019-05-28 DIAGNOSIS — F41.9 ANXIETY AND DEPRESSION: ICD-10-CM

## 2019-05-28 RX ORDER — SERTRALINE HYDROCHLORIDE 25 MG/1
25 TABLET, FILM COATED ORAL DAILY
Qty: 30 TAB | Refills: 2 | Status: SHIPPED | OUTPATIENT
Start: 2019-05-28 | End: 2019-08-29 | Stop reason: SDUPTHER

## 2019-05-28 NOTE — TELEPHONE ENCOUNTER
Pharmacy requesting medication refill 90 day supply     Requested Prescriptions     Pending Prescriptions Disp Refills    sertraline (ZOLOFT) 25 mg tablet 30 Tab 2     Sig: Take 1 Tab by mouth daily.      Pharmacy on file verified  ([facility] [phone])

## 2019-08-06 ENCOUNTER — OFFICE VISIT (OUTPATIENT)
Dept: FAMILY MEDICINE CLINIC | Age: 49
End: 2019-08-06

## 2019-08-06 VITALS
DIASTOLIC BLOOD PRESSURE: 90 MMHG | SYSTOLIC BLOOD PRESSURE: 122 MMHG | RESPIRATION RATE: 18 BRPM | OXYGEN SATURATION: 97 % | WEIGHT: 205.6 LBS | BODY MASS INDEX: 32.27 KG/M2 | HEART RATE: 80 BPM | TEMPERATURE: 98.6 F | HEIGHT: 67 IN

## 2019-08-06 DIAGNOSIS — R73.03 PREDIABETES: ICD-10-CM

## 2019-08-06 DIAGNOSIS — E78.5 HYPERLIPIDEMIA, UNSPECIFIED HYPERLIPIDEMIA TYPE: ICD-10-CM

## 2019-08-06 DIAGNOSIS — Z11.3 SCREENING FOR STD (SEXUALLY TRANSMITTED DISEASE): ICD-10-CM

## 2019-08-06 DIAGNOSIS — I10 HTN, GOAL BELOW 140/90: Primary | ICD-10-CM

## 2019-08-06 DIAGNOSIS — R53.83 FATIGUE, UNSPECIFIED TYPE: ICD-10-CM

## 2019-08-06 DIAGNOSIS — M79.10 MYALGIA: ICD-10-CM

## 2019-08-06 RX ORDER — ESCITALOPRAM OXALATE 10 MG/1
10 TABLET ORAL DAILY
Status: CANCELLED | OUTPATIENT
Start: 2019-08-06

## 2019-08-06 NOTE — PATIENT INSTRUCTIONS
Fatigue: Care Instructions Your Care Instructions Fatigue is a feeling of tiredness, exhaustion, or lack of energy. You may feel fatigue because of too much or not enough activity. It can also come from stress, lack of sleep, boredom, and poor diet. Many medical problems, such as viral infections, can cause fatigue. Emotional problems, especially depression, are often the cause of fatigue. Fatigue is most often a symptom of another problem. Treatment for fatigue depends on the cause. For example, if you have fatigue because you have a certain health problem, treating this problem also treats your fatigue. If depression or anxiety is the cause, treatment may help. Follow-up care is a key part of your treatment and safety. Be sure to make and go to all appointments, and call your doctor if you are having problems. It's also a good idea to know your test results and keep a list of the medicines you take. How can you care for yourself at home? · Get regular exercise. But don't overdo it. Go back and forth between rest and exercise. · Get plenty of rest. 
· Eat a healthy diet. Do not skip meals, especially breakfast. 
· Reduce your use of caffeine, tobacco, and alcohol. Caffeine is most often found in coffee, tea, cola drinks, and chocolate. · Limit medicines that can cause fatigue. This includes tranquilizers and cold and allergy medicines. When should you call for help? Watch closely for changes in your health, and be sure to contact your doctor if: 
  · You have new symptoms such as fever or a rash.  
  · Your fatigue gets worse.  
  · You have been feeling down, depressed, or hopeless. Or you may have lost interest in things that you usually enjoy.  
  · You are not getting better as expected. Where can you learn more? Go to http://darcie-jessi.info/. Enter G554 in the search box to learn more about \"Fatigue: Care Instructions. \" Current as of: September 23, 2018 Content Version: 12.1 © 7558-7765 [employer]. Care instructions adapted under license by [employer] (which disclaims liability or warranty for this information). If you have questions about a medical condition or this instruction, always ask your healthcare professional. [address] any warranty or liability for your use of this information.

## 2019-08-06 NOTE — PROGRESS NOTES
Patient Name: Hallie Holliday   MRN: [id number]    Felisa8 Dakota Bauer is a 54 y.o. female who presents with the following: The patient has hypertension, hyperlipidemia and pre-dm. She reports taking medications as instructed, no medication side effects noted. Diet and Lifestyle: generally follows a low fat low cholesterol diet, generally follows a low sodium diet, no formal exercise but active during the day. Lab review: orders written for new lab studies as appropriate; see orders. Zoloft is helping mood but has a headache a few hours after taking it; takes Aleve. Does not want to change doses. Reports daytime fatigue. Does snore. Feels achy all over when she wakes up. Has not had period in 12 months. BP Readings from Last 3 Encounters:   08/06/19 122/90   05/03/19 138/86   01/09/19 138/88     Review of Systems   Constitutional: Positive for malaise/fatigue. Negative for fever and weight loss. Respiratory: Negative for cough, hemoptysis, shortness of breath and wheezing. Cardiovascular: Negative for chest pain, palpitations, leg swelling and PND. Gastrointestinal: Negative for abdominal pain, constipation, diarrhea, nausea and vomiting. Musculoskeletal: Positive for myalgias. The patient's medications, allergies, past medical history, surgical history, family history and social history were reviewed and updated where appropriate. Prior to Admission medications    Medication Sig Start Date End Date Taking? Authorizing Provider   sertraline (ZOLOFT) 25 mg tablet Take 1 Tab by mouth daily. 5/28/19  Yes Cristofer Saleem MD   diclofenac (VOLTAREN) 1 % gel APPLY 2 GRAMS TO AFFECTED AREA 4 TIMES A DAY 5/3/19  Yes Rico Angel MD   amLODIPine (NORVASC) 10 mg tablet TAKE 1 TAB BY MOUTH DAILY. 1/4/19  Yes Cristofer Saleem MD   naproxen sodium (ALEVE) 220 mg tablet Take 220 mg by mouth two (2) times daily (with meals).    Yes Provider, Historical   losartan (COZAAR) 25 mg tablet TAKE 1 TAB BY MOUTH DAILY. 8/24/18  Yes Bernette Oppenheim, MD   cholecalciferol, vitamin D3, (VITAMIN D3) 2,000 unit tab Take 2,000 Units by mouth daily. Provider, Historical       Allergies   Allergen Reactions    Coconut Hives    Propofol Other (comments)     Patient states she has a difficult time waking up           OBJECTIVE    Visit Vitals  /90 (BP 1 Location: Right arm, BP Patient Position: Sitting)   Pulse 80   Temp 98.6 °F (37 °C) (Oral)   Resp 18   Ht 5' 7\" (1.702 m)   Wt 205 lb 9.6 oz (93.3 kg)   SpO2 97%   BMI 32.20 kg/m²       Physical Exam   Constitutional: She is oriented to person, place, and time and well-developed, well-nourished, and in no distress. No distress. Eyes: Pupils are equal, round, and reactive to light. Conjunctivae and EOM are normal.   Neck: Normal range of motion. Neck supple. No thyromegaly present. Cardiovascular: Normal rate, regular rhythm and normal heart sounds. Exam reveals no gallop and no friction rub. No murmur heard. Pulmonary/Chest: Effort normal and breath sounds normal. No respiratory distress. She has no wheezes. Neurological: She is alert and oriented to person, place, and time. Skin: Skin is warm and dry. No rash noted. She is not diaphoretic. Psychiatric: Mood, memory, affect and judgment normal.   Nursing note and vitals reviewed. ASSESSMENT AND PLAN  Jessica Jones is a 54 y.o. female who presents today for:    1. HTN, goal below 140/90  Stable, continue current treatment. 2. Prediabetes  - HEMOGLOBIN A1C WITH EAG    3. Hyperlipidemia, unspecified hyperlipidemia type  Will calculate ASCVD risk score pending labs. I have reviewed/discussed the above normal BMI with the patient. I have recommended the following interventions: dietary management education, guidance, and counseling, encourage exercise, monitor weight and prescribed dietary intake. - METABOLIC PANEL, COMPREHENSIVE  - LIPID PANEL    4.  Fatigue, unspecified type  If labs normal, consider sleep study. - CBC WITH AUTOMATED DIFF  - FERRITIN  - IRON PROFILE  - VITAMIN D, 25 HYDROXY  - TSH AND FREE T4  - VITAMIN B12 & FOLATE    5. Myalgia  - CK    6. Screening for STD (sexually transmitted disease)  - HEPATITIS PANEL, ACUTE  - HIV 1/2 AG/AB, 4TH GENERATION,W RFLX CONFIRM  - RPR       Medications Discontinued During This Encounter   Medication Reason    cholecalciferol (VITAMIN D3) 50,000 unit capsule Therapy Completed       Follow-up and Dispositions    · Return in about 6 months (around 2/6/2020) for HTN follow up. Medication risks/benefits/costs/interactions/alternatives discussed with patient. Advised patient to call back or return to office if symptoms worsen/change/persist. If patient cannot reach us or should anything more severe/urgent arise he/she should proceed directly to the nearest emergency department. Discussed expected course/resolution/complications of diagnosis in detail with patient. Patient given a written after visit summary which includes his/her diagnoses, current medications and vitals. Patient expressed understanding with the diagnosis and plan. Rico Stauffer M.D.

## 2019-08-06 NOTE — PROGRESS NOTES
Chief Complaint   Patient presents with    Medication Evaluation     zoloft     1. Have you been to the ER, urgent care clinic since your last visit? Hospitalized since your last visit? No    2. Have you seen or consulted any other health care providers outside of the [address] [place] [address] since your last visit? Include any pap smears or colon screening.  No

## 2019-08-07 LAB
25(OH)D3+25(OH)D2 SERPL-MCNC: 26.2 NG/ML (ref 30–100)
ALBUMIN SERPL-MCNC: 4.5 G/DL (ref 3.5–5.5)
ALBUMIN/GLOB SERPL: 1.5 {RATIO} (ref 1.2–2.2)
ALP SERPL-CCNC: 106 IU/L (ref 39–117)
ALT SERPL-CCNC: 12 IU/L (ref 0–32)
AST SERPL-CCNC: 13 IU/L (ref 0–40)
BASOPHILS # BLD AUTO: 0 X10E3/UL (ref 0–0.2)
BASOPHILS NFR BLD AUTO: 1 %
BILIRUB SERPL-MCNC: 0.2 MG/DL (ref 0–1.2)
BUN SERPL-MCNC: 12 MG/DL (ref 6–24)
BUN/CREAT SERPL: 15 (ref 9–23)
CALCIUM SERPL-MCNC: 9.9 MG/DL (ref 8.7–10.2)
CHLORIDE SERPL-SCNC: 106 MMOL/L (ref 96–106)
CHOLEST SERPL-MCNC: 212 MG/DL (ref 100–199)
CK SERPL-CCNC: 129 U/L (ref 24–173)
CO2 SERPL-SCNC: 19 MMOL/L (ref 20–29)
CREAT SERPL-MCNC: 0.78 MG/DL (ref 0.57–1)
EOSINOPHIL # BLD AUTO: 0.1 X10E3/UL (ref 0–0.4)
EOSINOPHIL NFR BLD AUTO: 2 %
ERYTHROCYTE [DISTWIDTH] IN BLOOD BY AUTOMATED COUNT: 13.4 % (ref 12.3–15.4)
EST. AVERAGE GLUCOSE BLD GHB EST-MCNC: 126 MG/DL
FERRITIN SERPL-MCNC: 126 NG/ML (ref 15–150)
FOLATE SERPL-MCNC: 10.8 NG/ML
GLOBULIN SER CALC-MCNC: 3 G/DL (ref 1.5–4.5)
GLUCOSE SERPL-MCNC: 95 MG/DL (ref 65–99)
HAV IGM SERPL QL IA: NEGATIVE
HBA1C MFR BLD: 6 % (ref 4.8–5.6)
HBV CORE IGM SERPL QL IA: NEGATIVE
HBV SURFACE AG SERPL QL IA: NEGATIVE
HCT VFR BLD AUTO: 42.8 % (ref 34–46.6)
HCV AB S/CO SERPL IA: <0.1 S/CO RATIO (ref 0–0.9)
HDLC SERPL-MCNC: 64 MG/DL
HGB BLD-MCNC: 13.5 G/DL (ref 11.1–15.9)
HIV 1+2 AB+HIV1 P24 AG SERPL QL IA: NON REACTIVE
IMM GRANULOCYTES # BLD AUTO: 0 X10E3/UL (ref 0–0.1)
IMM GRANULOCYTES NFR BLD AUTO: 0 %
INTERPRETATION, 910389: NORMAL
IRON SATN MFR SERPL: 24 % (ref 15–55)
IRON SERPL-MCNC: 70 UG/DL (ref 27–159)
LDLC SERPL CALC-MCNC: 134 MG/DL (ref 0–99)
LYMPHOCYTES # BLD AUTO: 1.9 X10E3/UL (ref 0.7–3.1)
LYMPHOCYTES NFR BLD AUTO: 42 %
MCH RBC QN AUTO: 28.7 PG (ref 26.6–33)
MCHC RBC AUTO-ENTMCNC: 31.5 G/DL (ref 31.5–35.7)
MCV RBC AUTO: 91 FL (ref 79–97)
MONOCYTES # BLD AUTO: 0.3 X10E3/UL (ref 0.1–0.9)
MONOCYTES NFR BLD AUTO: 8 %
NEUTROPHILS # BLD AUTO: 2.1 X10E3/UL (ref 1.4–7)
NEUTROPHILS NFR BLD AUTO: 47 %
PLATELET # BLD AUTO: 340 X10E3/UL (ref 150–450)
POTASSIUM SERPL-SCNC: 4.3 MMOL/L (ref 3.5–5.2)
PROT SERPL-MCNC: 7.5 G/DL (ref 6–8.5)
RBC # BLD AUTO: 4.71 X10E6/UL (ref 3.77–5.28)
RPR SER QL: NON REACTIVE
SODIUM SERPL-SCNC: 139 MMOL/L (ref 134–144)
T4 FREE SERPL-MCNC: 1.18 NG/DL (ref 0.82–1.77)
TIBC SERPL-MCNC: 288 UG/DL (ref 250–450)
TRIGL SERPL-MCNC: 72 MG/DL (ref 0–149)
TSH SERPL DL<=0.005 MIU/L-ACNC: 2.34 UIU/ML (ref 0.45–4.5)
UIBC SERPL-MCNC: 218 UG/DL (ref 131–425)
VIT B12 SERPL-MCNC: 375 PG/ML (ref 232–1245)
VLDLC SERPL CALC-MCNC: 14 MG/DL (ref 5–40)
WBC # BLD AUTO: 4.4 X10E3/UL (ref 3.4–10.8)

## 2019-08-07 NOTE — PROGRESS NOTES
Spoke to patient informed her of lab results and recommendations. Patient sates she will follow all recommendations. Patient would like to know if you can complete accommodations forms for her employer? She has not seen the Neurologist yet, and your aware of her condition she wanted to see if you were comfortable completing her form?

## 2019-10-24 ENCOUNTER — HOSPITAL ENCOUNTER (OUTPATIENT)
Age: 49
Setting detail: OUTPATIENT SURGERY
Discharge: HOME | End: 2019-10-24
Attending: SPECIALIST | Admitting: SPECIALIST
Payer: MEDICAID

## 2019-10-24 ENCOUNTER — ANESTHESIA EVENT (OUTPATIENT)
Dept: ENDOSCOPY | Age: 49
End: 2019-10-24
Payer: MEDICAID

## 2019-10-24 ENCOUNTER — ANESTHESIA (OUTPATIENT)
Dept: ENDOSCOPY | Age: 49
End: 2019-10-24
Payer: MEDICAID

## 2019-10-24 VITALS
WEIGHT: 208 LBS | DIASTOLIC BLOOD PRESSURE: 98 MMHG | HEIGHT: 67 IN | BODY MASS INDEX: 32.65 KG/M2 | RESPIRATION RATE: 18 BRPM | HEART RATE: 53 BPM | OXYGEN SATURATION: 99 % | TEMPERATURE: 98 F | SYSTOLIC BLOOD PRESSURE: 160 MMHG

## 2019-10-24 PROCEDURE — 74011250636 HC RX REV CODE- 250/636: Performed by: NURSE ANESTHETIST, CERTIFIED REGISTERED

## 2019-10-24 PROCEDURE — 76060000033 HC ANESTHESIA 1 TO 1.5 HR: Performed by: SPECIALIST

## 2019-10-24 PROCEDURE — 76040000008: Performed by: SPECIALIST

## 2019-10-24 PROCEDURE — 77030019988 HC FCPS ENDOSC DISP BSC -B: Performed by: SPECIALIST

## 2019-10-24 PROCEDURE — 77030021593 HC FCPS BIOP ENDOSC BSC -A: Performed by: SPECIALIST

## 2019-10-24 PROCEDURE — 74011250636 HC RX REV CODE- 250/636: Performed by: SPECIALIST

## 2019-10-24 PROCEDURE — 74011000250 HC RX REV CODE- 250: Performed by: NURSE ANESTHETIST, CERTIFIED REGISTERED

## 2019-10-24 PROCEDURE — 88305 TISSUE EXAM BY PATHOLOGIST: CPT

## 2019-10-24 RX ORDER — FLUMAZENIL 0.1 MG/ML
0.2 INJECTION INTRAVENOUS
Status: DISCONTINUED | OUTPATIENT
Start: 2019-10-24 | End: 2019-10-24 | Stop reason: HOSPADM

## 2019-10-24 RX ORDER — ATROPINE SULFATE 0.1 MG/ML
0.5 INJECTION INTRAVENOUS
Status: DISCONTINUED | OUTPATIENT
Start: 2019-10-24 | End: 2019-10-24 | Stop reason: HOSPADM

## 2019-10-24 RX ORDER — DEXTROMETHORPHAN/PSEUDOEPHED 2.5-7.5/.8
1.2 DROPS ORAL
Status: DISCONTINUED | OUTPATIENT
Start: 2019-10-24 | End: 2019-10-24 | Stop reason: HOSPADM

## 2019-10-24 RX ORDER — SODIUM CHLORIDE 0.9 % (FLUSH) 0.9 %
5-40 SYRINGE (ML) INJECTION EVERY 8 HOURS
Status: DISCONTINUED | OUTPATIENT
Start: 2019-10-24 | End: 2019-10-24 | Stop reason: HOSPADM

## 2019-10-24 RX ORDER — SODIUM CHLORIDE 0.9 % (FLUSH) 0.9 %
5-40 SYRINGE (ML) INJECTION AS NEEDED
Status: DISCONTINUED | OUTPATIENT
Start: 2019-10-24 | End: 2019-10-24 | Stop reason: HOSPADM

## 2019-10-24 RX ORDER — GLYCOPYRROLATE 0.2 MG/ML
INJECTION INTRAMUSCULAR; INTRAVENOUS AS NEEDED
Status: DISCONTINUED | OUTPATIENT
Start: 2019-10-24 | End: 2019-10-24 | Stop reason: HOSPADM

## 2019-10-24 RX ORDER — SODIUM CHLORIDE 9 MG/ML
INJECTION, SOLUTION INTRAVENOUS
Status: DISCONTINUED | OUTPATIENT
Start: 2019-10-24 | End: 2019-10-24 | Stop reason: HOSPADM

## 2019-10-24 RX ORDER — EPINEPHRINE 0.1 MG/ML
1 INJECTION INTRACARDIAC; INTRAVENOUS
Status: DISCONTINUED | OUTPATIENT
Start: 2019-10-24 | End: 2019-10-24 | Stop reason: HOSPADM

## 2019-10-24 RX ORDER — PROPOFOL 10 MG/ML
INJECTION, EMULSION INTRAVENOUS AS NEEDED
Status: DISCONTINUED | OUTPATIENT
Start: 2019-10-24 | End: 2019-10-24 | Stop reason: HOSPADM

## 2019-10-24 RX ORDER — SODIUM CHLORIDE 9 MG/ML
50 INJECTION, SOLUTION INTRAVENOUS CONTINUOUS
Status: DISCONTINUED | OUTPATIENT
Start: 2019-10-24 | End: 2019-10-24 | Stop reason: HOSPADM

## 2019-10-24 RX ORDER — NALOXONE HYDROCHLORIDE 0.4 MG/ML
0.4 INJECTION, SOLUTION INTRAMUSCULAR; INTRAVENOUS; SUBCUTANEOUS
Status: DISCONTINUED | OUTPATIENT
Start: 2019-10-24 | End: 2019-10-24 | Stop reason: HOSPADM

## 2019-10-24 RX ADMIN — PROPOFOL 20 MG: 10 INJECTION, EMULSION INTRAVENOUS at 13:08

## 2019-10-24 RX ADMIN — PROPOFOL 20 MG: 10 INJECTION, EMULSION INTRAVENOUS at 13:10

## 2019-10-24 RX ADMIN — PROPOFOL 20 MG: 10 INJECTION, EMULSION INTRAVENOUS at 13:02

## 2019-10-24 RX ADMIN — PROPOFOL 20 MG: 10 INJECTION, EMULSION INTRAVENOUS at 13:00

## 2019-10-24 RX ADMIN — PROPOFOL 20 MG: 10 INJECTION, EMULSION INTRAVENOUS at 13:06

## 2019-10-24 RX ADMIN — GLYCOPYRROLATE 0.2 MG: 0.2 INJECTION, SOLUTION INTRAMUSCULAR; INTRAVENOUS at 13:02

## 2019-10-24 RX ADMIN — SODIUM CHLORIDE: 900 INJECTION, SOLUTION INTRAVENOUS at 12:32

## 2019-10-24 RX ADMIN — PROPOFOL 20 MG: 10 INJECTION, EMULSION INTRAVENOUS at 13:04

## 2019-10-24 NOTE — PERIOP NOTES

## 2019-10-24 NOTE — ANESTHESIA POSTPROCEDURE EVALUATION
Post-Anesthesia Evaluation and Assessment    Patient: Sheila Merritt MRN: [id number]  SSN: [id number]    YOB: 1970  Age: 54 y.o. Sex: female      I have evaluated the patient and they are stable and ready for discharge from the PACU. Cardiovascular Function/Vital Signs  Visit Vitals  /68   Pulse 82   Temp 37 °C (98.6 °F)   Resp 20   Ht 5' 7\" (1.702 m)   Wt 94.3 kg (208 lb)   SpO2 96%   Breastfeeding? No   BMI 32.58 kg/m²       Patient is status post MAC anesthesia for Procedure(s):  ESOPHAGOGASTRODUODENOSCOPY (EGD)  COLONOSCOPY  ESOPHAGOGASTRODUODENAL (EGD) BIOPSY  ENDOSCOPIC POLYPECTOMY. Nausea/Vomiting: None    Postoperative hydration reviewed and adequate. Pain:  Pain Scale 1: Adult Nonverbal Pain Scale (10/24/19 1319)  Pain Intensity 1: 0 (10/24/19 1319)   Managed    Neurological Status: At baseline    Mental Status, Level of Consciousness: Alert and  oriented to person, place, and time    Pulmonary Status:   O2 Device: Room air (10/24/19 1319)   Adequate oxygenation and airway patent    Complications related to anesthesia: None    Post-anesthesia assessment completed. No concerns    Signed By: Paulina Victoria MD     October 24, 2019              Procedure(s):  ESOPHAGOGASTRODUODENOSCOPY (EGD)  COLONOSCOPY  ESOPHAGOGASTRODUODENAL (EGD) BIOPSY  ENDOSCOPIC POLYPECTOMY. MAC    <BSHSIANPOST>    Vitals Value Taken Time   /70 10/24/2019  1:26 PM   Temp     Pulse 75 10/24/2019  1:28 PM   Resp 20 10/24/2019  1:28 PM   SpO2 98 % 10/24/2019  1:28 PM   Vitals shown include unvalidated device data.

## 2019-10-24 NOTE — ANESTHESIA PREPROCEDURE EVALUATION
Relevant Problems   No relevant active problems       Anesthetic History          Comments: H/o prolonged wake up with colonoscopy     Review of Systems / Medical History  Patient summary reviewed, nursing notes reviewed and pertinent labs reviewed    Pulmonary          Smoker         Neuro/Psych         Psychiatric history     Cardiovascular    Hypertension          Hyperlipidemia    Exercise tolerance: >4 METS     GI/Hepatic/Renal  Within defined limits              Endo/Other  Within defined limits           Other Findings              Physical Exam    Airway  Mallampati: II  TM Distance: 4 - 6 cm  Neck ROM: normal range of motion   Mouth opening: Normal     Cardiovascular  Regular rate and rhythm,  S1 and S2 normal,  no murmur, click, rub, or gallop             Dental  No notable dental hx       Pulmonary  Breath sounds clear to auscultation               Abdominal  GI exam deferred       Other Findings            Anesthetic Plan    ASA: 2  Anesthesia type: MAC          Induction: Intravenous  Anesthetic plan and risks discussed with: Patient

## 2019-10-24 NOTE — PROCEDURES
[address]  [address]                 NAME:  Chet Hewitt   :   1970   MRN:   [id number]     Date/Time:  10/24/2019 1:16 PM    Esophagogastroduodenoscopy (EGD) Procedure Note    :  Oliver Berger MD    Referring Provider:  Arthur Kelsey MD    Anethesia/Sedation:  MAC anesthesia Propofol    Preoperative diagnosis: Anemia    Postoperative diagnosis: 1. Gastritis  2. Ulcers  3. Sigmoid Polyp    Procedure Details     After infom consent was obtained for the procedure, with all risks and benefits of procedure explained the patient was taken to the endoscopy suite and placed in the left lateral decubitus position. Following sequential administration of sedation as per above, the [id number] gastroscope was inserted into the mouth and advanced under direct vision to second portion of the duodenum. A careful inspection was made as the gastroscope was withdrawn, including a retroflexed view of the proximal stomach; findings and interventions are described below. Findings:  Esophagus:normal, random biopsies done  Stomach:Patchy erythema and congestion in body, biopsies done  Duodenum/jejunum: few 2 mm ulcers seen in bulb      Therapies:  none    Specimens: gastric, esophageal bx           EBL: None    Complications:   None; patient tolerated the procedure well. Impression:    See Postoperative diagnosis above    Recommendations:  -Acid suppression with a proton pump inhibitor. , -Await pathology. , -No NSAIDS    Discharge disposition:  Home in the company of  when able to ambulate    Oliver Berger MD

## 2019-10-24 NOTE — ROUTINE PROCESS
Jessica Pinto  1970  [id number]    Situation:  Verbal report received from: GIGI Harrison RN  Procedure: Procedure(s):  ESOPHAGOGASTRODUODENOSCOPY (EGD)  COLONOSCOPY  ESOPHAGOGASTRODUODENAL (EGD) BIOPSY  ENDOSCOPIC POLYPECTOMY    Background:    Preoperative diagnosis: Anemia  Postoperative diagnosis: 1. Gastritis  2. Ulcers  3. Sigmoid Polyp    :  Dr. Kris Khan  Assistant(s): Endoscopy Technician-1: Vee GODINEZ  Endoscopy RN-1: Carlo New    Specimens:   ID Type Source Tests Collected by Time Destination   1 : Gastric Biopsy Preservative Gastric  Magdiel Houston MD 10/24/2019 1254 Pathology   2 : Random Esophagus Biopsy Chaiative   Magdiel Houston MD 10/24/2019 1256 Pathology   3 : Sigmoid Colon Polyp Preservative Sigmoid  Magdiel Houston MD 10/24/2019 1309 Pathology     H. Pylori  no    Assessment:  Intra-procedure medications Anesthesia gave intra-procedure sedation and medications, see anesthesia flow sheet yes    Intravenous fluids: NS@ KVO     Vital signs stable     Abdominal assessment: round and soft     Recommendation:  Discharge patient per MD order.     Family or Friend   Permission to share finding with family or friend yes

## 2019-10-24 NOTE — DISCHARGE INSTRUCTIONS
Nely Crowe  [id number]  1970    COLON/EGD DISCHARGE INSTRUCTIONS  Discomfort:  Redness at IV site- apply warm compress to area; if redness or soreness persist- contact your physician  There may be a slight amount of blood passed from the rectum  Gaseous discomfort- walking, belching will help relieve any discomfort  Sore throat- throat lozenges or warm salt water gargle  You may not operate a vehicle for 12 hours  You may not engage in an occupation involving machinery or appliances for rest of today  You may not drink alcoholic beverages for at least 12 hours  Avoid making any critical decisions for at least 24 hour  DIET:   Regular diet. - however -  remember your colon is empty and a heavy meal will produce gas. Avoid these foods:  vegetables, fried / greasy foods, carbonated drinks for today. MEDICATIONS:      Regarding Aspirin or Nonsteroidal medications, please see below. ACTIVITY:  You may resume your normal daily activities it is recommended that you spend the remainder of the day resting -  avoid any strenuous activity. CALL M.D. ANY SIGN OF:  Increasing pain, nausea, vomiting  Abdominal distension (swelling)  New increased bleeding (oral or rectal)  Fever (chills)  Pain in chest area  Bloody discharge from nose or mouth  Shortness of breath    You may not  take any Advil, Aspirin, Ibuprofen, Motrin, Aleve, or Goodys for 10 days, ONLY  Tylenol as needed for pain. Post procedure diagnosis: 1. Gastritis  2. Ulcers  3.  Sigmoid Polyp      Follow-up Instructions:   Call Dr. Roxi Bae  Results of procedure / biopsy in 10 days  Telephone #  [phone]      DISCHARGE SUMMARY from Nurse    The following personal items collected during your admission are returned to you:   Dental Appliance: Dental Appliances: None  Vision: Visual Aid: None  Hearing Aid:    Jewelry:    Clothing:    Other Valuables:    Valuables sent to safe:      Patient Education   Patient Education        Colon Polyps: Care Instructions  Your Care Instructions    Colon polyps are growths in the colon or the rectum. The cause of most colon polyps is not known, and most people who get them do not have any problems. But a certain kind can turn into cancer. For this reason, regular testing for colon polyps is important for people as they get older. It is also important for anyone who has an increased risk for colon cancer. Polyps are usually found through routine colon cancer screening tests. Although most colon polyps are not cancerous, they are usually removed and then tested for cancer. Screening for colon cancer saves lives because the cancer can usually be cured if it is caught early. If you have a polyp that is the type that can turn into cancer, you may need more tests to examine your entire colon. The doctor will remove any other polyps that he or she finds, and you will be tested more often. Follow-up care is a key part of your treatment and safety. Be sure to make and go to all appointments, and call your doctor if you are having problems. It's also a good idea to know your test results and keep a list of the medicines you take. How can you care for yourself at home? Regular exams to look for colon polyps are the best way to prevent polyps from turning into colon cancer. These can include stool tests, sigmoidoscopy, colonoscopy, and CT colonography. Talk with your doctor about a testing schedule that is right for you. To prevent polyps  There is no home treatment that can prevent colon polyps. But these steps may help lower your risk for cancer. · Stay active. Being active can help you get to and stay at a healthy weight. Try to exercise on most days of the week. Walking is a good choice. · Eat well. Choose a variety of vegetables, fruits, legumes (such as peas and beans), fish, poultry, and whole grains. · Do not smoke. If you need help quitting, talk to your doctor about stop-smoking programs and medicines.  These can increase your chances of quitting for good. · If you drink alcohol, limit how much you drink. Limit alcohol to 2 drinks a day for men and 1 drink a day for women. When should you call for help? Call your doctor now or seek immediate medical care if:    · You have severe belly pain.     · Your stools are maroon or very bloody.    Watch closely for changes in your health, and be sure to contact your doctor if:    · You have a fever.     · You have nausea or vomiting.     · You have a change in bowel habits (new constipation or diarrhea).     · Your symptoms get worse or are not improving as expected. Where can you learn more? Go to http://darcie-jessi.info/. Enter 95 [id number] in the search box to learn more about \"Colon Polyps: Care Instructions. \"  Current as of: December 19, 2018  Content Version: 12.2  © 6184-3441 [employer]. Care instructions adapted under license by [employer] (which disclaims liability or warranty for this information). If you have questions about a medical condition or this instruction, always ask your healthcare professional. [place] [address] any warranty or liability for your use of this information. Gastritis: Care Instructions  Your Care Instructions    Gastritis is a sore and upset stomach. It happens when something irritates the stomach lining. Many things can cause it. These include an infection such as the flu or something you ate or drank. Medicines or a sore on the lining of the stomach (ulcer) also can cause it. Your belly may bloat and ache. You may belch, vomit, and feel sick to your stomach. You should be able to relieve the problem by taking medicine. And it may help to change your diet. If gastritis lasts, your doctor may prescribe medicine. Follow-up care is a key part of your treatment and safety. Be sure to make and go to all appointments, and call your doctor if you are having problems.  It's also a good idea to know your test results and keep a list of the medicines you take. How can you care for yourself at home? · If your doctor prescribed antibiotics, take them as directed. Do not stop taking them just because you feel better. You need to take the full course of antibiotics. · Be safe with medicines. If your doctor prescribed medicine to decrease stomach acid, take it as directed. Call your doctor if you think you are having a problem with your medicine. · Do not take any other medicine, including over-the-counter pain relievers, without talking to your doctor first.  · If your doctor recommends over-the-counter medicine to reduce stomach acid, such as Pepcid AC, Prilosec, Tagamet HB, or Zantac 75, follow the directions on the label. · Drink plenty of fluids (enough so that your urine is light yellow or clear like water) to prevent dehydration. Choose water and other caffeine-free clear liquids. If you have kidney, heart, or liver disease and have to limit fluids, talk with your doctor before you increase the amount of fluids you drink. · Limit how much alcohol you drink. · Avoid coffee, tea, cola drinks, chocolate, and other foods with caffeine. They increase stomach acid. When should you call for help? Call 911 anytime you think you may need emergency care. For example, call if:    · You vomit blood or what looks like coffee grounds.     · You pass maroon or very bloody stools.    Call your doctor now or seek immediate medical care if:    · You start breathing fast and have not produced urine in the last 8 hours.     · You cannot keep fluids down.    Watch closely for changes in your health, and be sure to contact your doctor if:    · You do not get better as expected. Where can you learn more? Go to http://darcie-jessi.info/. Enter [id number] in the search box to learn more about \"Gastritis: Care Instructions. \"  Current as of: November 7, 2018  Content Version: 12.2  © 1867-4306 [employer]. Care instructions adapted under license by [employer] (which disclaims liability or warranty for this information). If you have questions about a medical condition or this instruction, always ask your healthcare professional. [address] any warranty or liability for your use of this information.

## 2019-10-24 NOTE — PROCEDURES
[address]  [address]                 Colonoscopy Procedure Note    Indications:   See Preoperative Diagnosis above  Referring Physician: Natasha Whaley MD  Anesthesia/Sedation: MAC anesthesia Propofol  Endoscopist:  Dr. Jose Roberto Ivory  Assistant:  Endoscopy Technician-1: Finesse GODINEZ  Endoscopy RN-1: Maximiliano Maier  Preoperative diagnosis: Anemia  Postoperative diagnosis: 1. Gastritis  2. Duodenal Ulcers  3. Sigmoid Polyp    Procedure in Detail:  Informed consent was obtained for the procedure, including sedation. Risks of perforation, hemorrhage, adverse drug reaction, and aspiration were discussed. The patient was placed in the left lateral decubitus position. Based on the pre-procedure assessment, including review of the patient's medical history, medications, allergies, and review of systems, she had been deemed to be an appropriate candidate for moderate sedation; she was therefore sedated with the medications listed above. The patient was monitored continuously with ECG tracing, pulse oximetry, blood pressure monitoring, and direct observations. A rectal examination was performed. The [id number] was inserted into the rectum and advanced under direct vision to the cecum, which was identified by the ileocecal valve and appendiceal orifice. The quality of the colonic preparation was good. A careful inspection was made as the colonoscope was withdrawn, including a retroflexed view of the rectum; findings and interventions are described below. Appropriate photodocumentation was obtained. Findings:  Rectum: normal  Sigmoid: 3 mm polyp biopsied  Descending Colon: normal  Transverse Colon: normal  Ascending Colon: normal  Cecum: normal    Specimens:    sigmoid bx    EBL: None    Complications: None; patient tolerated the procedure well. Recommendations:     - Await pathology.     - If adenoma is present, repeat colonoscopy in 5 years.      Signed By: Serg Sellers MD                        October 24, 2019

## 2019-10-24 NOTE — H&P
Colonoscopy History and Physical      The patient was seen and examined. Date of last colonoscopy: none, Polyps  No      The airway was assessed and documented. The problem list, past medical history, and medications were reviewed.      Patient Active Problem List   Diagnosis Code    HTN, goal below 140/90 I10    Vitamin D deficiency E55.9    Depression F32.9    Chronic constipation K59.09    Gait disturbance R26.9    Speech disturbance R47.9    Tremor R25.1    Abnormal movements R25.9     Social History     Socioeconomic History    Marital status:      Spouse name: Not on file    Number of children: Not on file    Years of education: Not on file    Highest education level: Not on file   Occupational History    Not on file   Social Needs    Financial resource strain: Not on file    Food insecurity:     Worry: Not on file     Inability: Not on file    Transportation needs:     Medical: Not on file     Non-medical: Not on file   Tobacco Use    Smoking status: Current Some Day Smoker    Smokeless tobacco: Never Used    Tobacco comment: cigar   Substance and Sexual Activity    Alcohol use: Yes     Comment: occasional    Drug use: No    Sexual activity: Yes   Lifestyle    Physical activity:     Days per week: Not on file     Minutes per session: Not on file    Stress: Not on file   Relationships    Social connections:     Talks on phone: Not on file     Gets together: Not on file     Attends Orthodoxy service: Not on file     Active member of club or organization: Not on file     Attends meetings of clubs or organizations: Not on file     Relationship status: Not on file    Intimate partner violence:     Fear of current or ex partner: Not on file     Emotionally abused: Not on file     Physically abused: Not on file     Forced sexual activity: Not on file   Other Topics Concern    Not on file   Social History Narrative    Not on file     Past Medical History:   Diagnosis Date    Depression     Headache     HTN, goal below 140/90     Hypercholesterolemia     Seizures (HCC)     Psychogenic - non epilectic seizures         Prior to Admission Medications   Prescriptions Last Dose Informant Patient Reported? Taking? amLODIPine (NORVASC) 10 mg tablet   No No   Sig: TAKE 1 TAB BY MOUTH DAILY. cholecalciferol, vitamin D3, (VITAMIN D3) 2,000 unit tab   Yes No   Sig: Take 2,000 Units by mouth daily. diclofenac (VOLTAREN) 1 % gel   No No   Sig: APPLY 2 GRAMS TO AFFECTED AREA 4 TIMES A DAY   losartan (COZAAR) 25 mg tablet   No No   Sig: TAKE 1 TAB BY MOUTH DAILY. naproxen sodium (ALEVE) 220 mg tablet   Yes No   Sig: Take 220 mg by mouth two (2) times daily (with meals). sertraline (ZOLOFT) 25 mg tablet   No No   Sig: Take 1 Tab by mouth daily. Facility-Administered Medications: None       The patient was seen and examined in the endoscopy suite. The airway was assessed and docuemented. The problem list and medications were reviewed. Chief complaint, history of present illness, and review of systems and Past medical History are positive for: EPigastric pain, GERD, dysphagia and colon cancer screening    The heart, lungs and mental status were satisfactory for the administration of sedation and for the procedure. I discussed with the patient the objectives, risks, consequences and alternatives to the procedure.      Plan: Endoscopic procedure with sedation     Shantell Flores MD   10/24/2019  12:30 PM

## 2019-10-29 LAB — COLONOSCOPY, EXTERNAL: NORMAL

## 2019-12-27 NOTE — PATIENT INSTRUCTIONS

## 2020-01-31 NOTE — PROGRESS NOTES
LMTCB [No Pertinent Cardiac History] : no history of aortic stenosis, atrial fibrillation, coronary artery disease, recent myocardial infarction, or implantable device/pacemaker [Asthma] : asthma [Sleep Apnea] : sleep apnea [No Adverse Anesthesia Reaction] : no adverse anesthesia reaction in self or family member [(Patient denies any chest pain, claudication, dyspnea on exertion, orthopnea, palpitations or syncope)] : Patient denies any chest pain, claudication, dyspnea on exertion, orthopnea, palpitations or syncope [COPD] : no COPD [Smoker] : not a smoker [Chronic Anticoagulation] : no chronic anticoagulation [Chronic Kidney Disease] : no chronic kidney disease [Diabetes] : no diabetes [FreeTextEntry1] : TURP and Bladder Stone Removal [FreeTextEntry2] : 02/05/2020 [FreeTextEntry3] : Dr. Blevins

## 2020-02-06 ENCOUNTER — OFFICE VISIT (OUTPATIENT)
Dept: FAMILY MEDICINE CLINIC | Age: 50
End: 2020-02-06

## 2020-02-06 VITALS
HEART RATE: 66 BPM | TEMPERATURE: 98.4 F | RESPIRATION RATE: 16 BRPM | HEIGHT: 67 IN | OXYGEN SATURATION: 98 % | WEIGHT: 215.2 LBS | SYSTOLIC BLOOD PRESSURE: 125 MMHG | BODY MASS INDEX: 33.78 KG/M2 | DIASTOLIC BLOOD PRESSURE: 90 MMHG

## 2020-02-06 DIAGNOSIS — F41.9 ANXIETY AND DEPRESSION: ICD-10-CM

## 2020-02-06 DIAGNOSIS — I10 HTN, GOAL BELOW 140/90: Primary | ICD-10-CM

## 2020-02-06 DIAGNOSIS — F32.A ANXIETY AND DEPRESSION: ICD-10-CM

## 2020-02-06 RX ORDER — SERTRALINE HYDROCHLORIDE 25 MG/1
25 TABLET, FILM COATED ORAL DAILY
Qty: 90 TAB | Refills: 3 | Status: SHIPPED | OUTPATIENT
Start: 2020-02-06 | End: 2020-12-30 | Stop reason: SDUPTHER

## 2020-02-06 NOTE — PROGRESS NOTES
Chief Complaint   Patient presents with    Hypertension     6 month follow up      1. Have you been to the ER, urgent care clinic since your last visit? Hospitalized since your last visit? No    2. Have you seen or consulted any other health care providers outside of the [address] [place] [address] since your last visit? Include any pap smears or colon screening.  No

## 2020-02-06 NOTE — PROGRESS NOTES
Patient Name: Jason Larson   MRN: [id number]    [address] is a 54 y.o. female who presents with the following:     Has upcoming bladder sling procedure on 2/24; will do pre op exam at the hospital.  Usually takes BP meds around 930. Seeing a counselor for anxiety; hx of pseudo seizures. Doing better. BP Readings from Last 3 Encounters:   02/06/20 125/90   10/24/19 (!) 160/98   08/06/19 122/90     Review of Systems   Constitutional: Negative for fever, malaise/fatigue and weight loss. Respiratory: Negative for cough, hemoptysis, shortness of breath and wheezing. Cardiovascular: Negative for chest pain, palpitations, leg swelling and PND. Gastrointestinal: Negative for abdominal pain, constipation, diarrhea, nausea and vomiting. The patient's medications, allergies, past medical history, surgical history, family history and social history were reviewed and updated where appropriate. Prior to Admission medications    Medication Sig Start Date End Date Taking? Authorizing Provider   amLODIPine (NORVASC) 10 mg tablet TAKE 1 TAB BY MOUTH DAILY. 1/17/20  Yes Aury Alexis MD   losartan (COZAAR) 25 mg tablet TAKE 1 TAB BY MOUTH DAILY. 12/31/19  Yes Aury Alexis MD   sertraline (ZOLOFT) 25 mg tablet Take 1 Tab by mouth daily. 8/29/19  Yes Aury Alexis MD   diclofenac (VOLTAREN) 1 % gel APPLY 2 GRAMS TO AFFECTED AREA 4 TIMES A DAY 5/3/19  Yes Toi Angel MD   naproxen sodium (ALEVE) 220 mg tablet Take 220 mg by mouth two (2) times daily (with meals). Yes Provider, Historical   cholecalciferol, vitamin D3, (VITAMIN D3) 2,000 unit tab Take 2,000 Units by mouth daily.    Yes Provider, Historical       Allergies   Allergen Reactions    Coconut Hives    Propofol Other (comments)     Patient states she has a difficult time waking up         OBJECTIVE    Visit Vitals  /90 (BP 1 Location: Left arm, BP Patient Position: Sitting)   Pulse 66   Temp 98.4 °F (36.9 °C) (Oral)   Resp 16   Ht 5' 7\" (1.702 m)   Wt 215 lb 3.2 oz (97.6 kg)   SpO2 98%   BMI 33.71 kg/m²       Physical Exam  Vitals signs and nursing note reviewed. Constitutional:       General: She is not in acute distress. Appearance: She is not diaphoretic. Eyes:      Conjunctiva/sclera: Conjunctivae normal.      Pupils: Pupils are equal, round, and reactive to light. Musculoskeletal: Normal range of motion. Skin:     General: Skin is warm and dry. Neurological:      Mental Status: She is alert and oriented to person, place, and time. Gait: Gait is intact. Psychiatric:         Mood and Affect: Mood and affect normal.         Cognition and Memory: Memory normal.         Judgment: Judgment normal.           ASSESSMENT AND PLAN  Jessica Sinha is a 54 y.o. female who presents today for:    1. HTN, goal below 140/90  Stable, continue current treatment. I have reviewed/discussed the above normal BMI with the patient. I have recommended the following interventions: dietary management education, guidance, and counseling, encourage exercise, monitor weight and prescribed dietary intake. 2. Anxiety and depression  Stable, continue current treatment. - sertraline (ZOLOFT) 25 mg tablet; Take 1 Tab by mouth daily. Dispense: 90 Tab; Refill: 3       Medications Discontinued During This Encounter   Medication Reason    sertraline (ZOLOFT) 25 mg tablet Reorder       Follow-up and Dispositions    · Return in about 6 months (around 8/6/2020) for HTN follow up. Medication risks/benefits/costs/interactions/alternatives discussed with patient. Advised patient to call back or return to office if symptoms worsen/change/persist. If patient cannot reach us or should anything more severe/urgent arise he/she should proceed directly to the nearest emergency department. Discussed expected course/resolution/complications of diagnosis in detail with patient.   Patient given a written after visit summary which includes his/her diagnoses, current medications and vitals. Patient expressed understanding with the diagnosis and plan. Rico Pastor M.D.

## 2020-02-06 NOTE — PATIENT INSTRUCTIONS
DASH Diet: Care Instructions Your Care Instructions The DASH diet is an eating plan that can help lower your blood pressure. DASH stands for Dietary Approaches to Stop Hypertension. Hypertension is high blood pressure. The DASH diet focuses on eating foods that are high in calcium, potassium, and magnesium. These nutrients can lower blood pressure. The foods that are highest in these nutrients are fruits, vegetables, low-fat dairy products, nuts, seeds, and legumes. But taking calcium, potassium, and magnesium supplements instead of eating foods that are high in those nutrients does not have the same effect. The DASH diet also includes whole grains, fish, and poultry. The DASH diet is one of several lifestyle changes your doctor may recommend to lower your high blood pressure. Your doctor may also want you to decrease the amount of sodium in your diet. Lowering sodium while following the DASH diet can lower blood pressure even further than just the DASH diet alone. Follow-up care is a key part of your treatment and safety. Be sure to make and go to all appointments, and call your doctor if you are having problems. It's also a good idea to know your test results and keep a list of the medicines you take. How can you care for yourself at home? Following the DASH diet · Eat 4 to 5 servings of fruit each day. A serving is 1 medium-sized piece of fruit, ½ cup chopped or canned fruit, 1/4 cup dried fruit, or 4 ounces (½ cup) of fruit juice. Choose fruit more often than fruit juice. · Eat 4 to 5 servings of vegetables each day. A serving is 1 cup of lettuce or raw leafy vegetables, ½ cup of chopped or cooked vegetables, or 4 ounces (½ cup) of vegetable juice. Choose vegetables more often than vegetable juice. · Get 2 to 3 servings of low-fat and fat-free dairy each day. A serving is 8 ounces of milk, 1 cup of yogurt, or 1 ½ ounces of cheese. · Eat 6 to 8 servings of grains each day. A serving is 1 slice of bread, 1 ounce of dry cereal, or ½ cup of cooked rice, pasta, or cooked cereal. Try to choose whole-grain products as much as possible. · Limit lean meat, poultry, and fish to 2 servings each day. A serving is 3 ounces, about the size of a deck of cards. · Eat 4 to 5 servings of nuts, seeds, and legumes (cooked dried beans, lentils, and split peas) each week. A serving is 1/3 cup of nuts, 2 tablespoons of seeds, or ½ cup of cooked beans or peas. · Limit fats and oils to 2 to 3 servings each day. A serving is 1 teaspoon of vegetable oil or 2 tablespoons of salad dressing. · Limit sweets and added sugars to 5 servings or less a week. A serving is 1 tablespoon jelly or jam, ½ cup sorbet, or 1 cup of lemonade. · Eat less than 2,300 milligrams (mg) of sodium a day. If you limit your sodium to 1,500 mg a day, you can lower your blood pressure even more. Tips for success · Start small. Do not try to make dramatic changes to your diet all at once. You might feel that you are missing out on your favorite foods and then be more likely to not follow the plan. Make small changes, and stick with them. Once those changes become habit, add a few more changes. · Try some of the following: ? Make it a goal to eat a fruit or vegetable at every meal and at snacks. This will make it easy to get the recommended amount of fruits and vegetables each day. ? Try yogurt topped with fruit and nuts for a snack or healthy dessert. ? Add lettuce, tomato, cucumber, and onion to sandwiches. ? Combine a ready-made pizza crust with low-fat mozzarella cheese and lots of vegetable toppings. Try using tomatoes, squash, spinach, broccoli, carrots, cauliflower, and onions. ? Have a variety of cut-up vegetables with a low-fat dip as an appetizer instead of chips and dip. ? Sprinkle sunflower seeds or chopped almonds over salads.  Or try adding chopped walnuts or almonds to cooked vegetables. ? Try some vegetarian meals using beans and peas. Add garbanzo or kidney beans to salads. Make burritos and tacos with mashed koch beans or black beans. Where can you learn more? Go to http://darcie-jessi.info/. Enter T064 in the search box to learn more about \"DASH Diet: Care Instructions. \" Current as of: April 9, 2019 Content Version: 12.2 © 4630-2370 [employer]. Care instructions adapted under license by [employer] (which disclaims liability or warranty for this information). If you have questions about a medical condition or this instruction, always ask your healthcare professional. [address] any warranty or liability for your use of this information.

## 2020-04-03 ENCOUNTER — OFFICE VISIT (OUTPATIENT)
Dept: FAMILY MEDICINE CLINIC | Age: 50
End: 2020-04-03

## 2020-04-03 VITALS
WEIGHT: 221.4 LBS | BODY MASS INDEX: 34.75 KG/M2 | OXYGEN SATURATION: 95 % | RESPIRATION RATE: 16 BRPM | HEIGHT: 67 IN | DIASTOLIC BLOOD PRESSURE: 92 MMHG | TEMPERATURE: 98.2 F | SYSTOLIC BLOOD PRESSURE: 156 MMHG | HEART RATE: 66 BPM

## 2020-04-03 DIAGNOSIS — Z11.3 ROUTINE SCREENING FOR STI (SEXUALLY TRANSMITTED INFECTION): Primary | ICD-10-CM

## 2020-04-03 DIAGNOSIS — N89.8 VAGINAL DISCHARGE: ICD-10-CM

## 2020-04-03 LAB
BILIRUB UR QL STRIP: NEGATIVE
GLUCOSE UR-MCNC: NEGATIVE MG/DL
KETONES P FAST UR STRIP-MCNC: NEGATIVE MG/DL
PH UR STRIP: 5.5 [PH] (ref 4.6–8)
PROT UR QL STRIP: NEGATIVE
SP GR UR STRIP: 1.03 (ref 1–1.03)
UA UROBILINOGEN AMB POC: NORMAL (ref 0.2–1)
URINALYSIS CLARITY POC: CLEAR
URINALYSIS COLOR POC: YELLOW
URINE BLOOD POC: NEGATIVE
URINE LEUKOCYTES POC: NORMAL
URINE NITRITES POC: NEGATIVE

## 2020-04-03 NOTE — PROGRESS NOTES
Chief Complaint   Patient presents with    Urinary Pain     discharge from bladder spleen   1. Have you been to the ER, urgent care clinic since your last visit? Hospitalized since your last visit? No    2. Have you seen or consulted any other health care providers outside of the [address] [place] [address] since your last visit? Include any pap smears or colon screening.  No

## 2020-04-03 NOTE — PROGRESS NOTES
Jessica Kinney  54 y.o. female  1970  [address] [facility], [address]  [phone]     [address] [facility]       Encounter Date: 4/3/2020           Established Patient Visit Note: Manfred Smith MD    Reason for Appointment:  Chief Complaint   Patient presents with    Urinary Pain     discharge from bladder sling       History of Present Illness:  History provided by patient    Farheen Moreno is a 54 y.o. female who presents to clinic today for:    Watery Discharge  - patient had bladder sling procedure performed recently with [facility]  - she reports that 3 weeks after the procedure she developed a watery discharge the vaginal area (she is not sure if it from from urethra or vagina). She reports that it has a musky odor to it.   -she has also noticed some spotting lasting one week and ending last weekend  - She denies any fevers/chills/pelvic pain/pain with urination/ changes in urination  - she reports that her BMs have been normal  - she reports that she has follow up scheduled with [facility] on Monday  - She reports that she has been sexually active, and she would like STD testing  - she denies any back pain      Review of Systems  Review of Systems   Constitutional: Negative for chills and fever. Respiratory: Negative for cough, shortness of breath and wheezing. Cardiovascular: Negative for chest pain and palpitations. Allergies: Coconut and Propofol    Medications: (Updated to reflect final medication list after visit)    Current Outpatient Medications:     sertraline (ZOLOFT) 25 mg tablet, Take 1 Tab by mouth daily. , Disp: 90 Tab, Rfl: 3    amLODIPine (NORVASC) 10 mg tablet, TAKE 1 TAB BY MOUTH DAILY. , Disp: 90 Tab, Rfl: 2    losartan (COZAAR) 25 mg tablet, TAKE 1 TAB BY MOUTH DAILY. , Disp: 90 Tab, Rfl: 2    diclofenac (VOLTAREN) 1 % gel, APPLY 2 GRAMS TO AFFECTED AREA 4 TIMES A DAY, Disp: 100 g, Rfl: 0    cholecalciferol, vitamin D3, (VITAMIN D3) 2,000 unit tab, Take 2,000 Units by mouth daily. , Disp: , Rfl:     naproxen sodium (ALEVE) 220 mg tablet, Take 220 mg by mouth two (2) times daily (with meals). , Disp: , Rfl:     History  Patient Care Team:  Rehan Benedict MD as PCP - General (Family Practice)  Rehan Benedict MD [facility]  Other, MD Georgi (Neurology)    Past Medical History: she has a past medical history of Depression, Encounter for screening colonoscopy (10/29/2019), Headache, HTN, goal below 140/90, Hypercholesterolemia, and Seizures ([facility] [address] Past Surgical History: she has a past surgical history that includes hx  section (, , ); hx laparotomy (); hx colonoscopy (); pr abdomen surgery proc unlisted; and colonoscopy (Left, 10/24/2019). Family Medical History: family history includes Cancer in her maternal grandmother and paternal grandmother; Colon Cancer in her maternal grandfather; Depression in her mother; Drug Abuse in her father and mother; Hypertension in her mother; Lupus in her maternal aunt; Prostate Cancer in her maternal grandfather and paternal grandfather. Social History: she reports that she has been smoking. She has never used smokeless tobacco. She reports current alcohol use. She reports that she does not use drugs. No specialty comments available. Health Maintenance Due   Topic Date Due    Pneumococcal 0-64 years (1 of  - PPSV23) 1978       Objective:   Visit Vitals  BP (!) 156/92   Pulse 66   Temp 98.2 °F (36.8 °C) (Oral)   Resp 16   Ht 5' 7\" (1.702 m)   Wt 221 lb 6.4 oz (100.4 kg)   SpO2 95%   BMI 34.68 kg/m²     Wt Readings from Last 3 Encounters:   20 221 lb 6.4 oz (100.4 kg)   20 215 lb 3.2 oz (97.6 kg)   10/24/19 208 lb (94.3 kg)       Physical Exam  Vitals signs and nursing note reviewed. Exam conducted with a chaperone present. Constitutional:       General: She is not in acute distress. Appearance: Normal appearance.    HENT:      Head: Normocephalic and atraumatic. Nose: Nose normal.      Mouth/Throat:      Mouth: Mucous membranes are moist.   Eyes:      Extraocular Movements: Extraocular movements intact. Conjunctiva/sclera: Conjunctivae normal.      Pupils: Pupils are equal, round, and reactive to light. Neck:      Musculoskeletal: Normal range of motion and neck supple. No neck rigidity or muscular tenderness. Cardiovascular:      Rate and Rhythm: Normal rate and regular rhythm. Pulses: Normal pulses. Heart sounds: Normal heart sounds. No murmur. No friction rub. No gallop. Pulmonary:      Effort: Pulmonary effort is normal. No respiratory distress. Breath sounds: Normal breath sounds. No wheezing, rhonchi or rales. Genitourinary:     General: Normal vulva. Exam position: Lithotomy position. Pubic Area: No rash or pubic lice. Labia:         Right: No rash, tenderness, lesion or injury. Left: No rash, tenderness, lesion or injury. Vagina: Vaginal discharge (small amount of white discharge) present. Cervix: Normal.      Uterus: Normal.       Adnexa: Right adnexa normal and left adnexa normal.   Musculoskeletal: Normal range of motion. Lymphadenopathy:      Cervical: No cervical adenopathy. Skin:     General: Skin is warm. Coloration: Skin is not jaundiced. Neurological:      General: No focal deficit present. Mental Status: She is alert and oriented to person, place, and time. Mental status is at baseline. Cranial Nerves: Cranial nerves are intact. Motor: Motor function is intact. Coordination: Coordination is intact. Psychiatric:         Mood and Affect: Mood normal.         Behavior: Behavior normal.         Thought Content: Thought content normal.         Judgment: Judgment normal.         Assessment & Plan:    Diagnoses and all orders for this visit:    1.  Routine screening for STI (sexually transmitted infection)  Patient reports that she would like screening for STI. Will order as requested. -     RPR W/REFLEX TITER AND TREPONEMA ABS  -     HIV 1/2 AG/AB, 4TH GENERATION,W RFLX CONFIRM  -     HEPATITIS C AB, RFLX TO QT BY PCR    2. Vaginal discharge  No suspicious findings on exam. Will obtain labs to evaluate further. Discussed precautions for avoiding intercourse and reasons to call or go to ED.   -     AMB POC URINALYSIS DIP STICK AUTO W/ MICRO   -     UA WITH REFLEX MICRO AND CULTURE  -     NUSWAB VAGINITIS PLUS  -     AMB POC URINALYSIS DIP STICK AUTO W/O MICRO    Other orders  -     MICROSCOPIC EXAMINATION          I have discussed the diagnosis with the patient and the intended plan as seen in the above orders. The patient has received an after-visit summary along with patient information handout. I have discussed medication side effects and warnings with the patient as well. Disposition  Follow-up and Dispositions    · Return if symptoms worsen or fail to improve.            Yobany Chaves MD

## 2020-04-04 LAB
APPEARANCE UR: CLEAR
BACTERIA #/AREA URNS HPF: NORMAL /[HPF]
BILIRUB UR QL STRIP: NEGATIVE
CASTS URNS QL MICRO: NORMAL /LPF
COLOR UR: YELLOW
EPI CELLS #/AREA URNS HPF: NORMAL /HPF (ref 0–10)
GLUCOSE UR QL: NEGATIVE
HGB UR QL STRIP: NEGATIVE
KETONES UR QL STRIP: NEGATIVE
LEUKOCYTE ESTERASE UR QL STRIP: NEGATIVE
MICRO URNS: NORMAL
MICRO URNS: NORMAL
MUCOUS THREADS URNS QL MICRO: PRESENT
NITRITE UR QL STRIP: NEGATIVE
PH UR STRIP: 5.5 [PH] (ref 5–7.5)
PROT UR QL STRIP: NORMAL
RBC #/AREA URNS HPF: NORMAL /HPF (ref 0–2)
SP GR UR: 1.02 (ref 1–1.03)
URINALYSIS REFLEX, 377202: NORMAL
UROBILINOGEN UR STRIP-MCNC: 0.2 MG/DL (ref 0.2–1)
WBC #/AREA URNS HPF: NORMAL /HPF (ref 0–5)

## 2020-04-08 ENCOUNTER — TELEPHONE (OUTPATIENT)
Dept: FAMILY MEDICINE CLINIC | Age: 50
End: 2020-04-08

## 2020-04-08 DIAGNOSIS — B96.89 BACTERIAL VAGINOSIS: Primary | ICD-10-CM

## 2020-04-08 DIAGNOSIS — N76.0 BACTERIAL VAGINOSIS: Primary | ICD-10-CM

## 2020-04-08 LAB
A VAGINAE DNA VAG QL NAA+PROBE: ABNORMAL SCORE
BVAB2 DNA VAG QL NAA+PROBE: ABNORMAL SCORE
C ALBICANS DNA VAG QL NAA+PROBE: NEGATIVE
C GLABRATA DNA VAG QL NAA+PROBE: NEGATIVE
C TRACH DNA VAG QL NAA+PROBE: NEGATIVE
MEGA1 DNA VAG QL NAA+PROBE: ABNORMAL SCORE
N GONORRHOEA DNA VAG QL NAA+PROBE: NEGATIVE
T VAGINALIS DNA VAG QL NAA+PROBE: NEGATIVE

## 2020-04-08 RX ORDER — METRONIDAZOLE 500 MG/1
500 TABLET ORAL 2 TIMES DAILY
Qty: 14 TAB | Refills: 0 | Status: SHIPPED | OUTPATIENT
Start: 2020-04-08 | End: 2020-04-15

## 2020-04-08 NOTE — PROGRESS NOTES
Please call patient to let her know that her labs showed bacterial vaginosis. I have sent an antibiotic to her pharmacy. Please advise her not to consume alcohol while taking this. The remainder of her labs were normal. Blood labs were ordered at last visit. Please advise her to have these obtained when she is able.

## 2020-04-08 NOTE — PROGRESS NOTES
Outbound call to patient at [phone]. Left message for patient to return call back to the office regarding lab results.

## 2020-09-29 RX ORDER — AMLODIPINE BESYLATE 10 MG/1
TABLET ORAL
Qty: 90 TAB | Refills: 0 | Status: SHIPPED | OUTPATIENT
Start: 2020-09-29 | End: 2020-12-03

## 2020-09-29 NOTE — TELEPHONE ENCOUNTER
Last Visit: 4/3/20  MD Christianna Osler  Next Appointment: Not scheduled  Previous Refill Encounter(s): 1/17/20  90 + 2    Requested Prescriptions     Pending Prescriptions Disp Refills    amLODIPine (NORVASC) 10 mg tablet 90 Tab 2     Sig: TAKE 1 TAB BY MOUTH DAILY.

## 2020-12-03 RX ORDER — AMLODIPINE BESYLATE 10 MG/1
TABLET ORAL
Qty: 90 TAB | Refills: 0 | Status: SHIPPED | OUTPATIENT
Start: 2020-12-03 | End: 2021-02-18 | Stop reason: SDUPTHER

## 2020-12-30 DIAGNOSIS — F41.9 ANXIETY AND DEPRESSION: ICD-10-CM

## 2020-12-30 DIAGNOSIS — F32.A ANXIETY AND DEPRESSION: ICD-10-CM

## 2020-12-30 RX ORDER — SERTRALINE HYDROCHLORIDE 25 MG/1
25 TABLET, FILM COATED ORAL DAILY
Qty: 90 TAB | Refills: 0 | Status: SHIPPED | OUTPATIENT
Start: 2020-12-30 | End: 2021-02-18 | Stop reason: SDUPTHER

## 2020-12-30 NOTE — TELEPHONE ENCOUNTER
Last Visit: 4/3/20 MD Ever Jaimes 2/6/20 MD Albino Ken  Next Appointment: Not scheduled- no show 10/12/20- Added appt needed for further refills. Previous Refill Encounter(s): 2/6/20 90 + 3    Requested Prescriptions     Pending Prescriptions Disp Refills    sertraline (ZOLOFT) 25 mg tablet 90 Tab 0     Sig: Take 1 Tab by mouth daily. Schedule office visit for further refills!

## 2021-02-18 ENCOUNTER — VIRTUAL VISIT (OUTPATIENT)
Dept: FAMILY MEDICINE CLINIC | Age: 51
End: 2021-02-18
Payer: MEDICAID

## 2021-02-18 DIAGNOSIS — F41.9 ANXIETY AND DEPRESSION: Primary | ICD-10-CM

## 2021-02-18 DIAGNOSIS — F32.A ANXIETY AND DEPRESSION: Primary | ICD-10-CM

## 2021-02-18 DIAGNOSIS — T14.8XXA SPLINTER: ICD-10-CM

## 2021-02-18 DIAGNOSIS — I10 HTN, GOAL BELOW 140/90: ICD-10-CM

## 2021-02-18 PROCEDURE — 99213 OFFICE O/P EST LOW 20 MIN: CPT | Performed by: FAMILY MEDICINE

## 2021-02-18 RX ORDER — LOSARTAN POTASSIUM 25 MG/1
TABLET ORAL
Qty: 90 TAB | Refills: 0 | Status: SHIPPED | OUTPATIENT
Start: 2021-02-18

## 2021-02-18 RX ORDER — AMLODIPINE BESYLATE 10 MG/1
TABLET ORAL
Qty: 90 TAB | Refills: 0 | Status: SHIPPED | OUTPATIENT
Start: 2021-02-18

## 2021-02-18 RX ORDER — SERTRALINE HYDROCHLORIDE 25 MG/1
25 TABLET, FILM COATED ORAL DAILY
Qty: 90 TAB | Refills: 0 | Status: SHIPPED | OUTPATIENT
Start: 2021-02-18

## 2021-02-18 NOTE — PROGRESS NOTES
Avis Story, who was evaluated through a synchronous (real-time) audio-video encounter, and/or her healthcare decision maker, is aware that it is a billable service, with coverage as determined by her insurance carrier. She provided verbal consent to proceed: YES, and patient identification was verified. It was conducted pursuant to the emergency declaration under the [facility], [address] [facility] authority and the [employer] and [employer] General Act. A caregiver was present when appropriate. Ability to conduct physical exam was limited. I was at home. The patient was at home. This virtual visit was conducted via [employer]. Pursuant to the emergency declaration under the [facility], [address] [place] [address] authority and the [employer] and [employer] Act, this Virtual  Visit was conducted to reduce the patient's risk of exposure to COVID-19 and provide continuity of care for an established patient. Services were provided through a video synchronous discussion virtually to substitute for in-person clinic visit. Due to this being a TeleHealth evaluation, many elements of the physical examination are unable to be assessed. Total Time: minutes: 5-10 minutes. Kiran Mcpherson MD    [phone]  Subjective:   Avis Story was seen for: [place] be [place] to [place]. Needs refills on meds as she will not have insurance until April. Had a wooden splinter stuck in her left hand 2 weeks ago. Thought she got it all out but area have become hard and slightly tender. BP is 148/97. Is a little stressed with moving. Prior to Admission medications    Medication Sig Start Date End Date Taking? Authorizing Provider   sertraline (ZOLOFT) 25 mg tablet Take 1 Tab by mouth daily. Schedule office visit for further refills!  12/30/20   Dhara Paiz MD   amLODIPine (NORVASC) 10 mg tablet TAKE 1 TABLET BY MOUTH EVERY DAY 12/3/20   Chacha Salazar MD   losartan (COZAAR) 25 mg tablet TAKE 1 TAB BY MOUTH DAILY. 12/31/19   Chacha Salazar MD   diclofenac (VOLTAREN) 1 % gel APPLY 2 GRAMS TO AFFECTED AREA 4 TIMES A DAY 5/3/19   Chacha Salazar MD   naproxen sodium (ALEVE) 220 mg tablet Take 220 mg by mouth two (2) times daily (with meals). Provider, Historical   cholecalciferol, vitamin D3, (VITAMIN D3) 2,000 unit tab Take 2,000 Units by mouth daily. Provider, Historical       Allergies   Allergen Reactions    Coconut Hives    Propofol Other (comments)     Patient states she has a difficult time waking up       Review of Systems   Constitutional: Negative for fever, malaise/fatigue and weight loss. Respiratory: Negative for cough, hemoptysis, shortness of breath and wheezing. Cardiovascular: Negative for chest pain, palpitations, leg swelling and PND. Gastrointestinal: Negative for abdominal pain, constipation, diarrhea, nausea and vomiting. Physical Exam:     There were no vitals taken for this visit. General: alert, cooperative, no distress   Mental  status: normal mood, behavior, speech, dress, motor activity, and thought processes, able to follow commands   HENT: NCAT   Neck: no visualized mass   Resp: no respiratory distress   Neuro: no gross deficits   Skin: no discoloration or lesions of concern on visible areas   Psychiatric: normal affect, consistent with stated mood, no evidence of hallucinations       Assessment & Plan:     Gladys Palma is a 52 y.o. female who presents today for:    1. Anxiety and depression  - sertraline (ZOLOFT) 25 mg tablet; Take 1 Tab by mouth daily. Dispense: 90 Tab; Refill: 0    2. HTN, goal below 140/90  Slightly high today; continue to watch. May need to increase losartan to 50 mg if persistently high. - amLODIPine (NORVASC) 10 mg tablet; TAKE 1 TABLET BY MOUTH EVERY DAY  Dispense: 90 Tab;  Refill: 0  - losartan (COZAAR) 25 mg tablet; TAKE 1 TAB BY MOUTH DAILY. Dispense: 90 Tab; Refill: 0    3. Splinter  Recommend Epsom salt soaks and gently using a nail file to scrape off dead skin to see if she can get a better visual of splinter. If it doesn't come out with tweezers, pt to see urgent care for removal.       Medications Discontinued During This Encounter   Medication Reason    losartan (COZAAR) 25 mg tablet REORDER    amLODIPine (NORVASC) 10 mg tablet REORDER    sertraline (ZOLOFT) 25 mg tablet REORDER           Treatment risks/benefits/costs/interactions/alternatives discussed with patient. Advised patient to call back or return to office if symptoms worsen/change/persist. If patient cannot reach us or should anything more severe/urgent arise he/she should proceed directly to the nearest emergency department. Discussed expected course/resolution/complications of diagnosis in detail with patient. Patient expressed understanding with the diagnosis and plan. Rico Hu M.D.

## 2022-03-19 PROBLEM — I10 HTN, GOAL BELOW 140/90: Status: ACTIVE | Noted: 2017-06-15

## 2022-03-19 PROBLEM — R26.9 GAIT DISTURBANCE: Status: ACTIVE | Noted: 2018-03-23

## 2022-03-19 PROBLEM — K59.09 CHRONIC CONSTIPATION: Status: ACTIVE | Noted: 2017-08-24

## 2022-03-19 PROBLEM — R47.9 SPEECH DISTURBANCE: Status: ACTIVE | Noted: 2018-03-23

## 2022-03-19 PROBLEM — R25.1 TREMOR: Status: ACTIVE | Noted: 2018-03-23

## 2022-03-19 PROBLEM — R25.9 ABNORMAL MOVEMENTS: Status: ACTIVE | Noted: 2018-03-23

## 2022-03-20 PROBLEM — F32.A DEPRESSION: Status: ACTIVE | Noted: 2017-06-15

## 2022-03-20 PROBLEM — E55.9 VITAMIN D DEFICIENCY: Status: ACTIVE | Noted: 2017-06-15

## 2022-10-13 PROBLEM — F32.A DEPRESSIVE DISORDER: Status: ACTIVE | Noted: 2022-10-13

## 2022-10-13 PROBLEM — I10 HYPERTENSIVE DISORDER: Status: ACTIVE | Noted: 2022-10-13

## 2023-05-26 RX ORDER — LOSARTAN POTASSIUM 25 MG/1
TABLET ORAL
COMMUNITY
Start: 2021-02-18

## 2023-05-26 RX ORDER — NAPROXEN SODIUM 220 MG
220 TABLET ORAL 2 TIMES DAILY WITH MEALS
COMMUNITY

## 2023-05-26 RX ORDER — AMLODIPINE BESYLATE 10 MG/1
1 TABLET ORAL DAILY
COMMUNITY
Start: 2021-02-18

## 2023-05-26 RX ORDER — SERTRALINE HYDROCHLORIDE 25 MG/1
25 TABLET, FILM COATED ORAL DAILY
COMMUNITY
Start: 2021-02-18

## (undated) DEVICE — FORCEPS BX L240CM JAW DIA2.8MM L CAP W/ NDL MIC MESH TOOTH

## (undated) DEVICE — TUBING HYDR IRR --

## (undated) DEVICE — FCPS RAD JAW 4LC 240CM W/NDL -- BX/20 RADIAL JAW 4